# Patient Record
Sex: MALE | Race: WHITE | NOT HISPANIC OR LATINO
[De-identification: names, ages, dates, MRNs, and addresses within clinical notes are randomized per-mention and may not be internally consistent; named-entity substitution may affect disease eponyms.]

---

## 2018-09-19 ENCOUNTER — MESSAGE (OUTPATIENT)
Age: 38
End: 2018-09-19

## 2018-09-20 ENCOUNTER — APPOINTMENT (OUTPATIENT)
Dept: UROLOGY | Facility: CLINIC | Age: 38
End: 2018-09-20
Payer: COMMERCIAL

## 2018-09-20 VITALS
WEIGHT: 295 LBS | RESPIRATION RATE: 14 BRPM | BODY MASS INDEX: 41.3 KG/M2 | SYSTOLIC BLOOD PRESSURE: 137 MMHG | HEART RATE: 88 BPM | HEIGHT: 71 IN | DIASTOLIC BLOOD PRESSURE: 91 MMHG

## 2018-09-20 DIAGNOSIS — F17.210 NICOTINE DEPENDENCE, CIGARETTES, UNCOMPLICATED: ICD-10-CM

## 2018-09-20 DIAGNOSIS — Z78.9 OTHER SPECIFIED HEALTH STATUS: ICD-10-CM

## 2018-09-20 DIAGNOSIS — Z87.39 PERSONAL HISTORY OF OTHER DISEASES OF THE MUSCULOSKELETAL SYSTEM AND CONNECTIVE TISSUE: ICD-10-CM

## 2018-09-20 LAB
BILIRUB UR QL STRIP: NORMAL
CLARITY UR: CLEAR
COLLECTION METHOD: NORMAL
GLUCOSE UR-MCNC: NORMAL
HCG UR QL: 0.2 EU/DL
HGB UR QL STRIP.AUTO: NORMAL
KETONES UR-MCNC: NORMAL
LEUKOCYTE ESTERASE UR QL STRIP: NORMAL
NITRITE UR QL STRIP: NORMAL
PH UR STRIP: 7
PROT UR STRIP-MCNC: NORMAL
SP GR UR STRIP: 1.02

## 2018-09-20 PROCEDURE — 51798 US URINE CAPACITY MEASURE: CPT

## 2018-09-20 PROCEDURE — 99204 OFFICE O/P NEW MOD 45 MIN: CPT | Mod: 25

## 2018-09-20 PROCEDURE — 81003 URINALYSIS AUTO W/O SCOPE: CPT | Mod: QW

## 2018-09-21 LAB
ESTRADIOL SERPL-MCNC: 8 PG/ML
FSH SERPL-MCNC: 3.5 IU/L
PROLACTIN SERPL-MCNC: 5.5 NG/ML

## 2018-09-25 LAB
TESTOST BND SERPL-MCNC: 8.15 NG/DL
TESTOSTERONE BIOAVAILABLE: 72 NG/DL
TESTOSTERONE TOTAL S: 302 NG/DL

## 2018-10-04 ENCOUNTER — APPOINTMENT (OUTPATIENT)
Dept: UROLOGY | Facility: CLINIC | Age: 38
End: 2018-10-04
Payer: COMMERCIAL

## 2018-10-04 VITALS
BODY MASS INDEX: 41.3 KG/M2 | HEART RATE: 93 BPM | SYSTOLIC BLOOD PRESSURE: 142 MMHG | DIASTOLIC BLOOD PRESSURE: 93 MMHG | RESPIRATION RATE: 14 BRPM | WEIGHT: 295 LBS | HEIGHT: 71 IN

## 2018-10-04 PROCEDURE — 99213 OFFICE O/P EST LOW 20 MIN: CPT | Mod: 25

## 2018-10-04 PROCEDURE — 96372 THER/PROPH/DIAG INJ SC/IM: CPT

## 2018-10-04 RX ORDER — TESTOSTERONE CYPIONATE 200 MG/ML
200 INJECTION, SOLUTION INTRAMUSCULAR
Refills: 0 | Status: COMPLETED | OUTPATIENT
Start: 2018-10-04

## 2018-10-04 RX ADMIN — TESTOSTERONE CYPIONATE 0 MG/ML: 200 INJECTION, SOLUTION INTRAMUSCULAR at 00:00

## 2018-10-18 ENCOUNTER — APPOINTMENT (OUTPATIENT)
Dept: UROLOGY | Facility: CLINIC | Age: 38
End: 2018-10-18
Payer: COMMERCIAL

## 2018-10-18 VITALS — BODY MASS INDEX: 41.3 KG/M2 | RESPIRATION RATE: 14 BRPM | HEIGHT: 71 IN | HEART RATE: 90 BPM | WEIGHT: 295 LBS

## 2018-10-18 PROCEDURE — 96372 THER/PROPH/DIAG INJ SC/IM: CPT

## 2018-10-19 ENCOUNTER — MEDICATION RENEWAL (OUTPATIENT)
Age: 38
End: 2018-10-19

## 2018-11-01 ENCOUNTER — APPOINTMENT (OUTPATIENT)
Dept: UROLOGY | Facility: CLINIC | Age: 38
End: 2018-11-01

## 2018-11-21 ENCOUNTER — MEDICATION RENEWAL (OUTPATIENT)
Age: 38
End: 2018-11-21

## 2018-12-05 ENCOUNTER — MESSAGE (OUTPATIENT)
Age: 38
End: 2018-12-05

## 2018-12-06 ENCOUNTER — APPOINTMENT (OUTPATIENT)
Dept: UROLOGY | Facility: CLINIC | Age: 38
End: 2018-12-06
Payer: COMMERCIAL

## 2018-12-06 PROCEDURE — 99214 OFFICE O/P EST MOD 30 MIN: CPT

## 2018-12-17 LAB
HCT VFR BLD CALC: 53.6 %
HGB BLD-MCNC: 18.4 G/DL

## 2018-12-19 LAB
TESTOST BND SERPL-MCNC: 43.6 NG/DL
TESTOSTERONE BIOAVAILABLE: 610 NG/DL
TESTOSTERONE TOTAL S: 1090 NG/DL

## 2019-01-18 ENCOUNTER — MEDICATION RENEWAL (OUTPATIENT)
Age: 39
End: 2019-01-18

## 2019-02-04 LAB
HCT VFR BLD CALC: 51.4 %
HGB BLD-MCNC: 17.1 G/DL

## 2019-02-06 LAB
TESTOST BND SERPL-MCNC: 6.52 NG/DL
TESTOSTERONE BIOAVAILABLE: 77 NG/DL
TESTOSTERONE TOTAL S: 233 NG/DL

## 2019-02-12 ENCOUNTER — MEDICATION RENEWAL (OUTPATIENT)
Age: 39
End: 2019-02-12

## 2019-02-14 ENCOUNTER — APPOINTMENT (OUTPATIENT)
Age: 39
End: 2019-02-14
Payer: COMMERCIAL

## 2019-02-14 VITALS
WEIGHT: 295 LBS | RESPIRATION RATE: 14 BRPM | BODY MASS INDEX: 41.3 KG/M2 | SYSTOLIC BLOOD PRESSURE: 144 MMHG | DIASTOLIC BLOOD PRESSURE: 85 MMHG | HEART RATE: 84 BPM | HEIGHT: 71 IN

## 2019-02-14 PROCEDURE — 99213 OFFICE O/P EST LOW 20 MIN: CPT | Mod: 25

## 2019-02-14 PROCEDURE — 96372 THER/PROPH/DIAG INJ SC/IM: CPT

## 2019-02-14 NOTE — HISTORY OF PRESENT ILLNESS
[FreeTextEntry1] : 40 yo male presents for follow up. \par Doing Testosterone self injections, 1 ml every 2 weeks. Last injection 3 weeks ago. \par Also does routine blood donation\par Feels better, improved energy and libido. \par Denies any difficulty with urination. \par \par Initially seen for low Testosterone. \par Has been feeling fatigued, low libido and has had 30 lbs weight gain in last 3 months. \par Rated erections as 4/5. \par Denied any difficulty with urination. \par Denied dysuria, hematuria, lower abdominal or flank pain, fever, chills or rigors. \par No family history of Prostate cancer.

## 2019-02-14 NOTE — ASSESSMENT
[FreeTextEntry1] : Hypogonadism:\par Discussed blood work. \par Testosterone cypionate 300 mg given. \par Will do self injections Testosterone cypionate 1.5 ml every 2 weeks. \par Repeat labs in May 2019. \par

## 2019-02-19 ENCOUNTER — MEDICATION RENEWAL (OUTPATIENT)
Age: 39
End: 2019-02-19

## 2019-03-26 ENCOUNTER — MEDICATION RENEWAL (OUTPATIENT)
Age: 39
End: 2019-03-26

## 2019-04-06 NOTE — REVIEW OF SYSTEMS
Date of service: 04/05/2019



PROCEDURE:  CT Abdomen and Pelvis with contrast



HISTORY:

Abd pain



COMPARISON:

CT abdomen and pelvis without contrast performed 2/1/18



TECHNIQUE:

Contrast dose: 95 mL Omnipaque 300 IV



Radiation dose:



Total exam DLP = 779.19 mGy-cm.



This CT exam was performed using one or more of the following dose 

reduction techniques: Automated exposure control, adjustment of the 

mA and/or kV according to patient size, and/or use of iterative 

reconstruction technique.



FINDINGS:



LOWER THORAX:

No visible consolidation, pleural effusion, or pneumothorax.



LIVER:

Hypoattenuation of the liver compatible with hepatic steatosis. 



GALLBLADDER AND BILE DUCTS:

Cholecystectomy. 



PANCREAS:

Unremarkable.



SPLEEN:

Unremarkable. 



ADRENALS:

Unremarkable. 



KIDNEYS AND URETERS:

The kidneys enhance symmetrically. No hydronephrosis or obstructing 

calculus identified. 



VASCULATURE:

No aortic aneurysm. Atherosclerotic calcifications of the aorta.



BOWEL:

Stomach is nondistended.  



Lack of oral contrast limits evaluation for bowel pathology.  Bowel 

loops appear within normal limits of caliber without evidence of 

obstruction.  Diverticulosis without CT evidence of acute 

diverticulitis. 



APPENDIX:

The appendix appears within normal limits of caliber. No secondary 

signs of acute appendicitis.



PERITONEUM:

No significant free fluid.  No definite free air. 



LYMPH NODES:

No bulky adenopathy identified. 



BLADDER:

Unremarkable. 



REPRODUCTIVE:

Unremarkable. 



BONES:

Osseous demineralization.  Degenerative changes most pronounced at 

L5-S1. 



OTHER FINDINGS:

None.



IMPRESSION:

Hepatic steatosis. 



Cholecystectomy. 



Diverticulosis without CT evidence of acute diverticulitis. 



Preliminary impression was provided by [facility] [see HPI] : see HPI [Negative] : Heme/Lymph

## 2019-04-18 ENCOUNTER — MEDICATION RENEWAL (OUTPATIENT)
Age: 39
End: 2019-04-18

## 2019-04-25 ENCOUNTER — MEDICATION RENEWAL (OUTPATIENT)
Age: 39
End: 2019-04-25

## 2019-07-04 ENCOUNTER — TRANSCRIPTION ENCOUNTER (OUTPATIENT)
Age: 39
End: 2019-07-04

## 2019-07-11 ENCOUNTER — APPOINTMENT (OUTPATIENT)
Dept: UROLOGY | Facility: CLINIC | Age: 39
End: 2019-07-11
Payer: COMMERCIAL

## 2019-07-11 VITALS
WEIGHT: 295 LBS | BODY MASS INDEX: 41.3 KG/M2 | OXYGEN SATURATION: 95 % | DIASTOLIC BLOOD PRESSURE: 85 MMHG | HEART RATE: 99 BPM | SYSTOLIC BLOOD PRESSURE: 142 MMHG | HEIGHT: 71 IN

## 2019-07-11 PROCEDURE — 99214 OFFICE O/P EST MOD 30 MIN: CPT

## 2019-07-11 NOTE — ASSESSMENT
[FreeTextEntry1] : Hypogonadism:\par Reviewed blood work. \par Asked to discuss abnormal labs with PCP. \par PSA went up, will monitor. \par Will get Estradiol with next labs. \par Discussed Xyosted. Pt interested. Did self injection teaching. \par \par Return to office in 3 months or sooner if any issues- with repeat labs.

## 2019-07-11 NOTE — HISTORY OF PRESENT ILLNESS
[FreeTextEntry1] : 40 yo male presents for follow up. \par Doing Testosterone self injections, 1.5 ml every 2 weeks. \par Also does routine blood donation. \par Feels better, improved energy and libido. \par Denies any difficulty with urination. \par \par Initially seen for low Testosterone. \par Has been feeling fatigued, low libido and has had 30 lbs weight gain in last 3 months. \par Rated erections as 4/5. \par Denied any difficulty with urination. \par Denied dysuria, hematuria, lower abdominal or flank pain, fever, chills or rigors. \par No family history of Prostate cancer.

## 2019-08-07 ENCOUNTER — MEDICATION RENEWAL (OUTPATIENT)
Age: 39
End: 2019-08-07

## 2019-09-05 ENCOUNTER — MEDICATION RENEWAL (OUTPATIENT)
Age: 39
End: 2019-09-05

## 2019-10-04 ENCOUNTER — RX RENEWAL (OUTPATIENT)
Age: 39
End: 2019-10-04

## 2019-10-04 ENCOUNTER — MEDICATION RENEWAL (OUTPATIENT)
Age: 39
End: 2019-10-04

## 2019-10-17 ENCOUNTER — APPOINTMENT (OUTPATIENT)
Dept: UROLOGY | Facility: CLINIC | Age: 39
End: 2019-10-17

## 2019-11-01 ENCOUNTER — MEDICATION RENEWAL (OUTPATIENT)
Age: 39
End: 2019-11-01

## 2019-11-29 ENCOUNTER — MEDICATION RENEWAL (OUTPATIENT)
Age: 39
End: 2019-11-29

## 2020-01-16 ENCOUNTER — APPOINTMENT (OUTPATIENT)
Dept: UROLOGY | Facility: CLINIC | Age: 40
End: 2020-01-16
Payer: COMMERCIAL

## 2020-01-16 VITALS
HEIGHT: 71 IN | HEART RATE: 86 BPM | DIASTOLIC BLOOD PRESSURE: 85 MMHG | BODY MASS INDEX: 40.88 KG/M2 | SYSTOLIC BLOOD PRESSURE: 151 MMHG | RESPIRATION RATE: 14 BRPM | WEIGHT: 292 LBS

## 2020-01-16 PROCEDURE — 96372 THER/PROPH/DIAG INJ SC/IM: CPT

## 2020-01-16 PROCEDURE — 99214 OFFICE O/P EST MOD 30 MIN: CPT | Mod: 25

## 2020-01-16 RX ORDER — TESTOSTERONE CYPIONATE 200 MG/ML
200 INJECTION, SOLUTION INTRAMUSCULAR
Refills: 0 | Status: COMPLETED | OUTPATIENT
Start: 2020-01-16

## 2020-01-16 RX ADMIN — TESTOSTERONE CYPIONATE 0 MG/ML: 200 INJECTION INTRAMUSCULAR at 00:00

## 2020-01-16 NOTE — PHYSICAL EXAM
[General Appearance - Well Developed] : well developed [General Appearance - In No Acute Distress] : no acute distress [Normal Appearance] : normal appearance [Skin Color & Pigmentation] : normal skin color and pigmentation [FreeTextEntry1] : normal peripheral circulation  [Normal Station and Gait] : the gait and station were normal for the patient's age [] : no respiratory distress [Oriented To Time, Place, And Person] : oriented to person, place, and time [No Focal Deficits] : no focal deficits

## 2020-01-16 NOTE — ASSESSMENT
[FreeTextEntry1] : Hypogonadism:\par Reviewed outside records. \par Encouraged blood donation or recommended pt see Hematology for Phlebotomy. \par Pt received Testosterone cypionate 300 mg today. Will start Xyosted 100 mg SC weekly next week. \par Repeat labs in 3 months. \par \par Return to office in 6 months or sooner if any issues.

## 2020-01-16 NOTE — HISTORY OF PRESENT ILLNESS
[FreeTextEntry1] : 40 yo male presents for follow up. \par Doing Testosterone self injections: Xyosted 75 mg weekly. \par Feels better decreased libido. \par Denies any difficulty with urination. \par \par Initially seen for low Testosterone. \par Has been feeling fatigued, low libido and has had 30 lbs weight gain in last 3 months. \par Rated erections as 4/5. \par Denied any difficulty with urination. \par Denied dysuria, hematuria, lower abdominal or flank pain, fever, chills or rigors. \par No family history of Prostate cancer.

## 2020-03-10 ENCOUNTER — RX RENEWAL (OUTPATIENT)
Age: 40
End: 2020-03-10

## 2020-03-25 ENCOUNTER — RX RENEWAL (OUTPATIENT)
Age: 40
End: 2020-03-25

## 2020-07-30 ENCOUNTER — APPOINTMENT (OUTPATIENT)
Dept: UROLOGY | Facility: CLINIC | Age: 40
End: 2020-07-30
Payer: COMMERCIAL

## 2020-07-30 PROCEDURE — 99214 OFFICE O/P EST MOD 30 MIN: CPT

## 2020-07-31 NOTE — PHYSICAL EXAM
[Normal Appearance] : normal appearance [General Appearance - Well Developed] : well developed [Abdomen Soft] : soft [General Appearance - In No Acute Distress] : no acute distress [Abdomen Mass (___ Cm)] : no abdominal mass palpated [Costovertebral Angle Tenderness] : no ~M costovertebral angle tenderness [Abdomen Tenderness] : non-tender [Penis Abnormality] : normal circumcised penis [Scrotum] : the scrotum was normal [Urethral Meatus] : meatus normal [Testes Tenderness] : no tenderness of the testes [Testes Mass (___cm)] : there were no testicular masses [Epididymis] : the epididymides were normal [No Prostate Nodules] : no prostate nodules [Prostate Tenderness] : the prostate was not tender [Prostate Size ___ gm] : prostate size [unfilled] gm [FreeTextEntry1] : normal peripheral circulation  [Skin Color & Pigmentation] : normal skin color and pigmentation [Normal Station and Gait] : the gait and station were normal for the patient's age [] : no respiratory distress [Oriented To Time, Place, And Person] : oriented to person, place, and time [No Focal Deficits] : no focal deficits

## 2020-07-31 NOTE — ASSESSMENT
[FreeTextEntry1] : Hypogonadism:\par Discussed treatment options. Will like to switch back to Testosterone cypionate injections. \par Has Xyosted 100 and 75 mg left, will do that next week: 175 mg and then in 2 weeks will start Testosterone cypionate. \par Testosterone cypionate 300 mg every 2 weeks. \par Will start Anastrazole. \par Repeat labs in 3 months. \par \par Return to office in 6 months or sooner if any issues.

## 2020-07-31 NOTE — HISTORY OF PRESENT ILLNESS
[FreeTextEntry1] : 41 yo male presents for follow up. \par Doing Testosterone self injections: Xyosted 100 mg weekly. \par Felt better with Testosterone injections than Xyosted. Variable libido. Normal erections. \par Gained weight and complaining of breast enlargement. \par Denies any difficulty with urination. \par Denies dysuria, hematuria, lower abdominal or flank pain, fever, chills or rigors. \par Did blood donation after lab work. \par \par Initially seen for low Testosterone. \par Has been feeling fatigued, low libido and has had 30 lbs weight gain in last 3 months. \par Rated erections as 4/5. \par Denied any difficulty with urination. \par Denied dysuria, hematuria, lower abdominal or flank pain, fever, chills or rigors. \par No family history of Prostate cancer.

## 2020-11-04 ENCOUNTER — NON-APPOINTMENT (OUTPATIENT)
Age: 40
End: 2020-11-04

## 2020-11-17 ENCOUNTER — APPOINTMENT (OUTPATIENT)
Dept: UROLOGY | Facility: CLINIC | Age: 40
End: 2020-11-17

## 2020-12-08 ENCOUNTER — APPOINTMENT (OUTPATIENT)
Dept: UROLOGY | Facility: CLINIC | Age: 40
End: 2020-12-08

## 2021-01-12 ENCOUNTER — APPOINTMENT (OUTPATIENT)
Dept: UROLOGY | Facility: CLINIC | Age: 41
End: 2021-01-12
Payer: COMMERCIAL

## 2021-01-12 VITALS — TEMPERATURE: 97.1 F | HEART RATE: 97 BPM | SYSTOLIC BLOOD PRESSURE: 133 MMHG | DIASTOLIC BLOOD PRESSURE: 79 MMHG

## 2021-01-12 PROCEDURE — 99072 ADDL SUPL MATRL&STAF TM PHE: CPT

## 2021-01-12 PROCEDURE — 99213 OFFICE O/P EST LOW 20 MIN: CPT

## 2021-01-12 NOTE — PHYSICAL EXAM
[Normal Appearance] : normal appearance [General Appearance - In No Acute Distress] : no acute distress [Skin Color & Pigmentation] : normal skin color and pigmentation [] : no respiratory distress [Oriented To Time, Place, And Person] : oriented to person, place, and time [Normal Station and Gait] : the gait and station were normal for the patient's age [FreeTextEntry1] : normal peripheral circulation

## 2021-01-12 NOTE — HISTORY OF PRESENT ILLNESS
[FreeTextEntry1] : 39 yo male presents for follow up. \par On Testosterone replacement therapy: Testosterone cypionate 300 mg every 2 weeks. \par Also taking Anastrozole. Towards the end of 2 weeks feels Fatigued. \par Did blood donation after last lab.\par Denies any difficulty with urination. \par Normal erections.\par \par With Xyosted. Variable libido.\par \par Initially seen for low Testosterone. \par Has been feeling fatigued, low libido and has had 30 lbs weight gain in last 3 months. \par Rated erections as 4/5. \par Denied any difficulty with urination. \par Denied dysuria, hematuria, lower abdominal or flank pain, fever, chills or rigors. \par No family history of Prostate cancer.

## 2021-01-12 NOTE — ASSESSMENT
[FreeTextEntry1] : Reviewed outside records.\par 11/6/20: \par Testosterone: 346 \par Hemoglobin and Hematocrit: 18.1/54.2\par Estradiol: < 5\par \par Hypogonadism:\par Will do Testosterone cypionate 100 mg every week. \par Continue Anastrozole. \par Repeat Labs in 2 months. \par \par Return to office in 6 months or sooner if any issues- will do Uroflo/PVR.

## 2021-04-26 ENCOUNTER — RX RENEWAL (OUTPATIENT)
Age: 41
End: 2021-04-26

## 2021-07-13 ENCOUNTER — APPOINTMENT (OUTPATIENT)
Dept: UROLOGY | Facility: CLINIC | Age: 41
End: 2021-07-13

## 2021-09-15 ENCOUNTER — NON-APPOINTMENT (OUTPATIENT)
Age: 41
End: 2021-09-15

## 2021-10-04 ENCOUNTER — NON-APPOINTMENT (OUTPATIENT)
Age: 41
End: 2021-10-04

## 2021-10-05 ENCOUNTER — APPOINTMENT (OUTPATIENT)
Dept: UROLOGY | Facility: CLINIC | Age: 41
End: 2021-10-05
Payer: COMMERCIAL

## 2021-10-05 ENCOUNTER — APPOINTMENT (OUTPATIENT)
Dept: UROLOGY | Facility: CLINIC | Age: 41
End: 2021-10-05

## 2021-10-05 VITALS
WEIGHT: 292 LBS | SYSTOLIC BLOOD PRESSURE: 153 MMHG | HEART RATE: 101 BPM | BODY MASS INDEX: 40.88 KG/M2 | HEIGHT: 71 IN | DIASTOLIC BLOOD PRESSURE: 93 MMHG

## 2021-10-05 LAB
ALBUMIN SERPL ELPH-MCNC: 4.8 G/DL
ALP BLD-CCNC: 64 U/L
ALT SERPL-CCNC: 56 U/L
ANION GAP SERPL CALC-SCNC: 14 MMOL/L
AST SERPL-CCNC: 30 U/L
BILIRUB SERPL-MCNC: 0.8 MG/DL
BUN SERPL-MCNC: 13 MG/DL
CALCIUM SERPL-MCNC: 10.1 MG/DL
CHLORIDE SERPL-SCNC: 102 MMOL/L
CHOLEST SERPL-MCNC: 249 MG/DL
CO2 SERPL-SCNC: 23 MMOL/L
CREAT SERPL-MCNC: 1.1 MG/DL
ESTIMATED AVERAGE GLUCOSE: 114 MG/DL
GLUCOSE SERPL-MCNC: 94 MG/DL
HBA1C MFR BLD HPLC: 5.6 %
HCT VFR BLD CALC: 51.5 %
HCT VFR BLD CALC: 53 %
HDLC SERPL-MCNC: 39 MG/DL
HGB BLD-MCNC: 17.3 G/DL
HGB BLD-MCNC: 17.4 G/DL
LDLC SERPL CALC-MCNC: 177 MG/DL
NONHDLC SERPL-MCNC: 211 MG/DL
POTASSIUM SERPL-SCNC: 4.7 MMOL/L
PROT SERPL-MCNC: 7.1 G/DL
PSA SERPL-MCNC: 1.63 NG/ML
SODIUM SERPL-SCNC: 139 MMOL/L
TESTOST BND SERPL-MCNC: 14 PG/ML
TESTOST SERPL-MCNC: 461 NG/DL
TESTOSTERONE TOTAL S: 548 NG/DL
TRIGL SERPL-MCNC: 167 MG/DL

## 2021-10-05 PROCEDURE — 51741 ELECTRO-UROFLOWMETRY FIRST: CPT

## 2021-10-05 PROCEDURE — 51798 US URINE CAPACITY MEASURE: CPT

## 2021-10-05 PROCEDURE — 99214 OFFICE O/P EST MOD 30 MIN: CPT | Mod: 25

## 2021-10-23 NOTE — ASSESSMENT
[FreeTextEntry1] : Benign Prostatic Hyperplasia:\par No bothersome lower urinary tract symptoms and minimal post void residual. \par See Uroflo and PVR. \par \par Hypogonadism:\par Discussed labs. \par Continue Testosterone cypionate 300 mg IM every 2 weeks. \par Will check Testosterone and Hemoglobin and Hematocrit in 3 months. \par \par Return to office in 6 months or sooner if any issues.

## 2021-10-23 NOTE — HISTORY OF PRESENT ILLNESS
[FreeTextEntry1] : 40 yo male presents for follow up. \par On Testosterone replacement therapy: Testosterone cypionate 300 mg every 2 weeks. \par Also taking Anastrozole. Towards the end of 2 weeks feels Fatigued. \par Did blood donation after last lab.\par Denies any difficulty with urination. \par Normal erections.\par \par With Xyosted. Variable libido.\par \par Initially seen for low Testosterone. \par Has been feeling fatigued, low libido and has had 30 lbs weight gain in last 3 months. \par Rated erections as 4/5. \par Denied any difficulty with urination. \par Denied dysuria, hematuria, lower abdominal or flank pain, fever, chills or rigors. \par No family history of Prostate cancer.

## 2021-11-30 RX ORDER — TESTOSTERONE CYPIONATE 200 MG/ML
200 INJECTION, SOLUTION INTRAMUSCULAR
Qty: 3 | Refills: 0 | Status: DISCONTINUED | COMMUNITY
Start: 2018-10-04 | End: 2021-11-30

## 2021-11-30 RX ORDER — TESTOSTERONE ENANTHATE 100 MG/.5ML
100 INJECTION SUBCUTANEOUS
Qty: 2 | Refills: 0 | Status: DISCONTINUED | COMMUNITY
Start: 2019-07-11 | End: 2021-11-30

## 2021-11-30 RX ORDER — TESTOSTERONE ENANTHATE 75 MG/.5ML
75 INJECTION SUBCUTANEOUS
Qty: 2 | Refills: 0 | Status: DISCONTINUED | COMMUNITY
Start: 2019-10-04 | End: 2021-11-30

## 2022-01-02 ENCOUNTER — LABORATORY RESULT (OUTPATIENT)
Age: 42
End: 2022-01-02

## 2022-02-16 RX ORDER — TESTOSTERONE CYPIONATE 100 MG/ML
100 INJECTION, SOLUTION INTRAMUSCULAR
Qty: 4 | Refills: 0 | Status: DISCONTINUED | COMMUNITY
Start: 2018-10-19 | End: 2022-02-16

## 2022-04-04 ENCOUNTER — APPOINTMENT (OUTPATIENT)
Dept: ORTHOPEDIC SURGERY | Facility: CLINIC | Age: 42
End: 2022-04-04

## 2022-04-12 ENCOUNTER — RX RENEWAL (OUTPATIENT)
Age: 42
End: 2022-04-12

## 2022-04-18 LAB
HCT VFR BLD CALC: 51 %
HGB BLD-MCNC: 16.6 G/DL
TESTOST BND SERPL-MCNC: 15.2 PG/ML
TESTOSTERONE TOTAL S: 548 NG/DL

## 2022-05-03 ENCOUNTER — APPOINTMENT (OUTPATIENT)
Dept: UROLOGY | Facility: CLINIC | Age: 42
End: 2022-05-03
Payer: COMMERCIAL

## 2022-05-03 VITALS
HEIGHT: 71 IN | HEART RATE: 94 BPM | TEMPERATURE: 98.7 F | RESPIRATION RATE: 16 BRPM | DIASTOLIC BLOOD PRESSURE: 75 MMHG | BODY MASS INDEX: 40.6 KG/M2 | WEIGHT: 290 LBS | SYSTOLIC BLOOD PRESSURE: 149 MMHG

## 2022-05-03 PROCEDURE — 99213 OFFICE O/P EST LOW 20 MIN: CPT

## 2022-05-04 NOTE — ASSESSMENT
[FreeTextEntry1] : Hypogonadism:\par Discussed labs. \par Discussed treatment options. Will increase Testosterone cypionate to 150 mg every week. \par Continue Anastrazole. \par Labs in 3 months. \par \par \par Return to office in 6 months or sooner if any issues.

## 2022-05-04 NOTE — PHYSICAL EXAM
[Normal Appearance] : normal appearance [General Appearance - In No Acute Distress] : no acute distress [Abdomen Soft] : soft [Abdomen Tenderness] : non-tender [FreeTextEntry1] : CURRY deferred, Patient request [] : no respiratory distress [Oriented To Time, Place, And Person] : oriented to person, place, and time [Normal Station and Gait] : the gait and station were normal for the patient's age

## 2022-05-04 NOTE — HISTORY OF PRESENT ILLNESS
[FreeTextEntry1] : 43 yo male presents for follow up. \par On Testosterone replacement therapy: Testosterone cypionate 100 mg every week.  \par Also taking Anastrozole. Towards the end of the week feels Fatigued. \par Did blood donation recently. \par Denies any difficulty with urination. \par Normal erections.\par On Antibiotics, has Diarrhea. \par \par With Xyosted had variable libido.\par \par Initially seen for low Testosterone. \par Has been feeling fatigued, low libido and has had 30 lbs weight gain in last 3 months. \par Rated erections as 4/5. \par Denied any difficulty with urination. \par Denied dysuria, hematuria, lower abdominal or flank pain, fever, chills or rigors. \par No family history of Prostate cancer.

## 2022-06-06 RX ORDER — HYDROCORTISONE 25 MG/G
2.5 CREAM TOPICAL TWICE DAILY
Qty: 1 | Refills: 3 | Status: ACTIVE | COMMUNITY
Start: 2022-06-06 | End: 1900-01-01

## 2022-06-15 RX ORDER — NEEDLES, FILTER 19GX1 1/2"
23G X 1" NEEDLE, DISPOSABLE MISCELLANEOUS
Refills: 0 | Status: ACTIVE | COMMUNITY

## 2022-06-15 RX ORDER — PROMETHAZINE HYDROCHLORIDE 12.5 MG/1
12.5 SUPPOSITORY RECTAL
Refills: 0 | Status: ACTIVE | COMMUNITY

## 2022-06-15 RX ORDER — ASPIRIN 300 MG
325 SUPPOSITORY, RECTAL RECTAL
Refills: 0 | Status: ACTIVE | COMMUNITY

## 2022-06-15 RX ORDER — OXYCODONE HYDROCHLORIDE 5 MG/1
5 CAPSULE ORAL
Refills: 0 | Status: ACTIVE | COMMUNITY

## 2022-06-15 RX ORDER — ONDANSETRON 8 MG/1
8 TABLET, ORALLY DISINTEGRATING ORAL
Refills: 0 | Status: ACTIVE | COMMUNITY

## 2022-06-21 ENCOUNTER — LABORATORY RESULT (OUTPATIENT)
Age: 42
End: 2022-06-21

## 2022-06-21 ENCOUNTER — RESULT CHARGE (OUTPATIENT)
Age: 42
End: 2022-06-21

## 2022-06-21 ENCOUNTER — APPOINTMENT (OUTPATIENT)
Dept: PHYSICAL MEDICINE AND REHAB | Facility: CLINIC | Age: 42
End: 2022-06-21
Payer: COMMERCIAL

## 2022-06-21 VITALS
BODY MASS INDEX: 41.3 KG/M2 | SYSTOLIC BLOOD PRESSURE: 120 MMHG | TEMPERATURE: 97.9 F | HEART RATE: 79 BPM | HEIGHT: 71 IN | WEIGHT: 295 LBS | RESPIRATION RATE: 16 BRPM | OXYGEN SATURATION: 98 % | DIASTOLIC BLOOD PRESSURE: 80 MMHG

## 2022-06-21 DIAGNOSIS — Z00.00 ENCOUNTER FOR GENERAL ADULT MEDICAL EXAMINATION W/OUT ABNORMAL FINDINGS: ICD-10-CM

## 2022-06-21 PROCEDURE — 81003 URINALYSIS AUTO W/O SCOPE: CPT | Mod: QW

## 2022-06-21 PROCEDURE — G0444 DEPRESSION SCREEN ANNUAL: CPT | Mod: 59

## 2022-06-21 PROCEDURE — 99396 PREV VISIT EST AGE 40-64: CPT | Mod: 25

## 2022-06-21 PROCEDURE — 36415 COLL VENOUS BLD VENIPUNCTURE: CPT

## 2022-06-21 PROCEDURE — 93000 ELECTROCARDIOGRAM COMPLETE: CPT | Mod: 59

## 2022-06-21 RX ORDER — AZITHROMYCIN 250 MG/1
250 TABLET, FILM COATED ORAL
Qty: 6 | Refills: 0 | Status: DISCONTINUED | COMMUNITY
Start: 2021-12-20

## 2022-06-21 RX ORDER — AMOXICILLIN 875 MG/1
875 TABLET, FILM COATED ORAL
Qty: 20 | Refills: 0 | Status: DISCONTINUED | COMMUNITY
Start: 2022-04-30

## 2022-06-21 NOTE — PHYSICAL EXAM
[No Acute Distress] : no acute distress [Well Nourished] : well nourished [Well Developed] : well developed [Well-Appearing] : well-appearing [Normal Sclera/Conjunctiva] : normal sclera/conjunctiva [PERRL] : pupils equal round and reactive to light [EOMI] : extraocular movements intact [Normal Outer Ear/Nose] : the outer ears and nose were normal in appearance [Normal Oropharynx] : the oropharynx was normal [No JVD] : no jugular venous distention [No Lymphadenopathy] : no lymphadenopathy [Supple] : supple [Thyroid Normal, No Nodules] : the thyroid was normal and there were no nodules present [No Respiratory Distress] : no respiratory distress  [No Accessory Muscle Use] : no accessory muscle use [Clear to Auscultation] : lungs were clear to auscultation bilaterally [Normal Rate] : normal rate  [Regular Rhythm] : with a regular rhythm [Normal S1, S2] : normal S1 and S2 [No Carotid Bruits] : no carotid bruits [No Abdominal Bruit] : a ~M bruit was not heard ~T in the abdomen [No Varicosities] : no varicosities [Pedal Pulses Present] : the pedal pulses are present [No Edema] : there was no peripheral edema [No Palpable Aorta] : no palpable aorta [No Extremity Clubbing/Cyanosis] : no extremity clubbing/cyanosis [Soft] : abdomen soft [Non Tender] : non-tender [Non-distended] : non-distended [No Masses] : no abdominal mass palpated [No HSM] : no HSM [Normal Bowel Sounds] : normal bowel sounds [Normal Posterior Cervical Nodes] : no posterior cervical lymphadenopathy [Normal Anterior Cervical Nodes] : no anterior cervical lymphadenopathy [No CVA Tenderness] : no CVA  tenderness [No Spinal Tenderness] : no spinal tenderness [No Joint Swelling] : no joint swelling [Grossly Normal Strength/Tone] : grossly normal strength/tone [No Rash] : no rash [Coordination Grossly Intact] : coordination grossly intact [No Focal Deficits] : no focal deficits [Normal Gait] : normal gait [Deep Tendon Reflexes (DTR)] : deep tendon reflexes were 2+ and symmetric [Normal Affect] : the affect was normal [Normal Insight/Judgement] : insight and judgment were intact [de-identified] : murmur

## 2022-06-21 NOTE — PLAN
[FreeTextEntry1] : EKG - NSR -78 , JAKOB - 0.158, No acute T wave changes noted..\par \par Labs Drawn and sent to [facility] labs.\par \par Patient to continue with present medications.\par \par Increase fluid intake..\par \par RTO in 7-10 days for re-eval..\par \par I, Markie Morales , attest that this documentation has been prepared under the direction and in the presence of Provider Sudeep Hendrix DNP\par The documentation recorded by the scribe, in my presence, accurately reflects the service I personally performed, and the decisions made by me with my edits as appropriate.\par Sudeep Hendrix DNP\par

## 2022-06-21 NOTE — HEALTH RISK ASSESSMENT
[0] : 2) Feeling down, depressed, or hopeless: Not at all (0) [PHQ-2 Negative - No further assessment needed] : PHQ-2 Negative - No further assessment needed [HOX2Eadjs] : 0

## 2022-06-21 NOTE — HISTORY OF PRESENT ILLNESS
[FreeTextEntry1] : wellness [de-identified] : Patient presents today for physical exam. His last PE was over a year ago and since that time he has not had any significant changes to his medical history. Denies any CP, SOB or diff breathing. No recent fever, chills, cough or cold type symptoms. Has no other complaints at this time.\par \par

## 2022-06-27 LAB
25(OH)D3 SERPL-MCNC: 65.8 NG/ML
ALBUMIN SERPL ELPH-MCNC: 4.7 G/DL
ALP BLD-CCNC: 68 U/L
ALT SERPL-CCNC: 41 U/L
ANION GAP SERPL CALC-SCNC: 18 MMOL/L
AST SERPL-CCNC: 29 U/L
BASOPHILS # BLD AUTO: 0.1 K/UL
BASOPHILS NFR BLD AUTO: 1.5 %
BILIRUB SERPL-MCNC: 0.8 MG/DL
BILIRUB UR QL STRIP: NORMAL
BUN SERPL-MCNC: 16 MG/DL
CALCIUM SERPL-MCNC: 9.3 MG/DL
CHLORIDE SERPL-SCNC: 102 MMOL/L
CHOLEST SERPL-MCNC: 229 MG/DL
CLARITY UR: CLEAR
CO2 SERPL-SCNC: 20 MMOL/L
COLLECTION METHOD: NORMAL
CREAT SERPL-MCNC: 1.2 MG/DL
EGFR: 76 ML/MIN/1.73M2
EOSINOPHIL # BLD AUTO: 0.22 K/UL
EOSINOPHIL NFR BLD AUTO: 3.3 %
ESTIMATED AVERAGE GLUCOSE: 108 MG/DL
ESTROGEN SERPL-MCNC: 183 PG/ML
FERRITIN SERPL-MCNC: 22 NG/ML
FOLATE SERPL-MCNC: 16.6 NG/ML
GLUCOSE SERPL-MCNC: 95 MG/DL
GLUCOSE UR-MCNC: NORMAL
HBA1C MFR BLD HPLC: 5.4 %
HCG UR QL: 0.2 EU/DL
HCT VFR BLD CALC: 55.2 %
HDLC SERPL-MCNC: 36 MG/DL
HGB BLD-MCNC: 17.5 G/DL
HGB UR QL STRIP.AUTO: NORMAL
IMM GRANULOCYTES NFR BLD AUTO: 0 %
IRON SATN MFR SERPL: 23 %
IRON SERPL-MCNC: 93 UG/DL
KETONES UR-MCNC: NORMAL
LDLC SERPL CALC-MCNC: 156 MG/DL
LEUKOCYTE ESTERASE UR QL STRIP: NORMAL
LYMPHOCYTES # BLD AUTO: 2.27 K/UL
LYMPHOCYTES NFR BLD AUTO: 33.7 %
MAGNESIUM SERPL-MCNC: 2.1 MG/DL
MAN DIFF?: NORMAL
MCHC RBC-ENTMCNC: 28.6 PG
MCHC RBC-ENTMCNC: 31.7 GM/DL
MCV RBC AUTO: 90.3 FL
MONOCYTES # BLD AUTO: 0.61 K/UL
MONOCYTES NFR BLD AUTO: 9.1 %
NEUTROPHILS # BLD AUTO: 3.54 K/UL
NEUTROPHILS NFR BLD AUTO: 52.4 %
NITRITE UR QL STRIP: NORMAL
NONHDLC SERPL-MCNC: 193 MG/DL
PH UR STRIP: 5.5
PLATELET # BLD AUTO: 282 K/UL
POTASSIUM SERPL-SCNC: 4.5 MMOL/L
PROT SERPL-MCNC: 7.4 G/DL
PROT UR STRIP-MCNC: NORMAL
PSA SERPL-MCNC: 2.18 NG/ML
RBC # BLD: 6.11 M/UL
RBC # FLD: 14.6 %
SODIUM SERPL-SCNC: 140 MMOL/L
SP GR UR STRIP: 1.02
T3 SERPL-MCNC: 134 NG/DL
T3RU NFR SERPL: 1 TBI
T4 FREE SERPL-MCNC: 1.2 NG/DL
TESTOST SERPL-MCNC: 796 NG/DL
TIBC SERPL-MCNC: 408 UG/DL
TRIGL SERPL-MCNC: 183 MG/DL
TSH SERPL-ACNC: 3.76 UIU/ML
UIBC SERPL-MCNC: 314 UG/DL
VIT B12 SERPL-MCNC: 1250 PG/ML
WBC # FLD AUTO: 6.74 K/UL

## 2022-06-29 ENCOUNTER — APPOINTMENT (OUTPATIENT)
Dept: PHYSICAL MEDICINE AND REHAB | Facility: CLINIC | Age: 42
End: 2022-06-29

## 2022-06-29 VITALS — WEIGHT: 295 LBS | BODY MASS INDEX: 41.3 KG/M2 | HEIGHT: 71 IN

## 2022-06-29 PROCEDURE — 99443: CPT | Mod: 95

## 2022-06-29 NOTE — PLAN
[FreeTextEntry1] : Labs reviewed with patient during this encounter.\par \par Patient to continue with present medications. \par Patient to start Vitamin therapy as discussed during visit - See medications below. \par Increase fluid intake.. \par RTO for repeat labs in 3 months. \par RTO in 3 months for re-evaluation. \par Diet teaching performed in depth during this visit related to cholesterol. \par At least 25 minutes was spent with patient via video conference reviewing findings/results during this visit. Ample time was provided to answer any questions or address concerns to the patients satisfaction..\par \par Patient was advised that this audiovisual encounter constitutes a billable visit, and that the visit will be billed on the same terms and conditions as an in-office, face-to-face visit. Patient was further advised they may be responsible for any co-payment, co-insurance, or other self-payment they would normally pay for an office visit, unless such self-payment was waived by their insurance carrier.\par 		\par At this time the patient is not willing to begin medication therapy for Cholesterol and wishes to modify diet in attempt to correct levels. The risk of elevated lipids were discussed at length during this visit and patient states understanding..\par \par

## 2022-06-29 NOTE — HISTORY OF PRESENT ILLNESS
[de-identified] : Patient encounter today for follow up on cholesterol.  States he has been doing well.  Denies any CP, SOB or diff breathing.  No recent fever, chills, cough or cold type symptoms.  Has no other complaints at this time.\par \par  [FreeTextEntry1] : Cholesterol

## 2022-09-06 LAB
HCT VFR BLD CALC: 52.2 %
HGB BLD-MCNC: 16.6 G/DL
TESTOST SERPL-MCNC: 668 NG/DL

## 2022-09-07 ENCOUNTER — NON-APPOINTMENT (OUTPATIENT)
Age: 42
End: 2022-09-07

## 2022-09-27 ENCOUNTER — APPOINTMENT (OUTPATIENT)
Dept: PHYSICAL MEDICINE AND REHAB | Facility: CLINIC | Age: 42
End: 2022-09-27

## 2022-10-28 ENCOUNTER — NON-APPOINTMENT (OUTPATIENT)
Age: 42
End: 2022-10-28

## 2022-10-31 ENCOUNTER — APPOINTMENT (OUTPATIENT)
Dept: PHYSICAL MEDICINE AND REHAB | Facility: CLINIC | Age: 42
End: 2022-10-31

## 2022-10-31 VITALS
WEIGHT: 295 LBS | BODY MASS INDEX: 41.3 KG/M2 | TEMPERATURE: 98.1 F | OXYGEN SATURATION: 99 % | SYSTOLIC BLOOD PRESSURE: 124 MMHG | HEART RATE: 76 BPM | HEIGHT: 71 IN | DIASTOLIC BLOOD PRESSURE: 78 MMHG | RESPIRATION RATE: 16 BRPM

## 2022-10-31 PROCEDURE — 99213 OFFICE O/P EST LOW 20 MIN: CPT | Mod: 25

## 2022-10-31 PROCEDURE — 36410 VNPNXR 3YR/> PHY/QHP DX/THER: CPT

## 2022-10-31 NOTE — HISTORY OF PRESENT ILLNESS
[FreeTextEntry1] : CHolesterol and testosterone [de-identified] : Patient presents today for repeat evaluation on cholesterol. States he has been doing well. Denies any CP, SOB or diff breathing. No recent fever, chills, cough or cold type symptoms. Has no other complaints at this time.

## 2022-10-31 NOTE — PLAN
[FreeTextEntry1] : Labs Drawn by Dr. Sudeep Hendrix due to poor venous access.  Patient required lab testing due to conditions in their past medical history requiring periodic monitoring.  Labs were sent to [facility].\par Patient to continue with present medications. \par Increase fluid intake.. \par RTO in 7-10 days for re-eval.. \par At least 20 minutes was spent with patient face to face examining and reviewing findings/results during this visit.  Ample time was provided to answer any questions or address concerns to the patients satisfaction..\par

## 2022-11-02 LAB
25(OH)D3 SERPL-MCNC: 83.5 NG/ML
ALBUMIN SERPL ELPH-MCNC: 4.7 G/DL
ALP BLD-CCNC: 59 U/L
ALT SERPL-CCNC: 39 U/L
ANION GAP SERPL CALC-SCNC: 15 MMOL/L
AST SERPL-CCNC: 26 U/L
BASOPHILS # BLD AUTO: 0.09 K/UL
BASOPHILS NFR BLD AUTO: 1.4 %
BILIRUB SERPL-MCNC: 0.6 MG/DL
BUN SERPL-MCNC: 13 MG/DL
CALCIUM SERPL-MCNC: 9.8 MG/DL
CHLORIDE SERPL-SCNC: 103 MMOL/L
CHOLEST SERPL-MCNC: 210 MG/DL
CO2 SERPL-SCNC: 21 MMOL/L
CREAT SERPL-MCNC: 1.11 MG/DL
EGFR: 84 ML/MIN/1.73M2
EOSINOPHIL # BLD AUTO: 0.16 K/UL
EOSINOPHIL NFR BLD AUTO: 2.4 %
ESTIMATED AVERAGE GLUCOSE: 120 MG/DL
GLUCOSE SERPL-MCNC: 88 MG/DL
HBA1C MFR BLD HPLC: 5.8 %
HCT VFR BLD CALC: 46.9 %
HDLC SERPL-MCNC: 36 MG/DL
HGB BLD-MCNC: 15 G/DL
IMM GRANULOCYTES NFR BLD AUTO: 0.2 %
LDLC SERPL CALC-MCNC: 146 MG/DL
LYMPHOCYTES # BLD AUTO: 2.34 K/UL
LYMPHOCYTES NFR BLD AUTO: 35.5 %
MAN DIFF?: NORMAL
MCHC RBC-ENTMCNC: 26.8 PG
MCHC RBC-ENTMCNC: 32 GM/DL
MCV RBC AUTO: 83.8 FL
MONOCYTES # BLD AUTO: 0.55 K/UL
MONOCYTES NFR BLD AUTO: 8.3 %
NEUTROPHILS # BLD AUTO: 3.44 K/UL
NEUTROPHILS NFR BLD AUTO: 52.2 %
NONHDLC SERPL-MCNC: 174 MG/DL
PLATELET # BLD AUTO: 313 K/UL
POTASSIUM SERPL-SCNC: 4.3 MMOL/L
PROT SERPL-MCNC: 7.1 G/DL
PSA SERPL-MCNC: 2.38 NG/ML
RBC # BLD: 5.6 M/UL
RBC # FLD: 14.3 %
SODIUM SERPL-SCNC: 139 MMOL/L
TESTOST SERPL-MCNC: 695 NG/DL
TRIGL SERPL-MCNC: 140 MG/DL
WBC # FLD AUTO: 6.59 K/UL

## 2022-11-08 ENCOUNTER — APPOINTMENT (OUTPATIENT)
Dept: UROLOGY | Facility: CLINIC | Age: 42
End: 2022-11-08

## 2022-11-08 LAB — ESTROGEN SERPL-MCNC: 23 PG/ML

## 2022-11-09 ENCOUNTER — NON-APPOINTMENT (OUTPATIENT)
Age: 42
End: 2022-11-09

## 2022-11-10 ENCOUNTER — APPOINTMENT (OUTPATIENT)
Dept: PHYSICAL MEDICINE AND REHAB | Facility: CLINIC | Age: 42
End: 2022-11-10

## 2022-11-10 VITALS — BODY MASS INDEX: 41.3 KG/M2 | HEIGHT: 71 IN | WEIGHT: 295 LBS

## 2022-11-10 PROCEDURE — 99213 OFFICE O/P EST LOW 20 MIN: CPT | Mod: 95

## 2022-11-10 NOTE — HISTORY OF PRESENT ILLNESS
[Home] : at home, [unfilled] , at the time of the visit. [Medical Office: ([facility])___] : at the medical office located in  [Verbal consent obtained from patient] : the patient, [unfilled] [FreeTextEntry1] : cholesterol [de-identified] : Patient encounter today for follow up on cholesterol.  States he has been doing well.  Denies any CP, SOB or diff breathing.  No recent fever, chills, cough or cold type symptoms.  Has no other complaints at this time.\par \par

## 2022-12-06 ENCOUNTER — APPOINTMENT (OUTPATIENT)
Dept: UROLOGY | Facility: CLINIC | Age: 42
End: 2022-12-06

## 2023-01-03 ENCOUNTER — APPOINTMENT (OUTPATIENT)
Dept: UROLOGY | Facility: CLINIC | Age: 43
End: 2023-01-03
Payer: COMMERCIAL

## 2023-01-03 VITALS
SYSTOLIC BLOOD PRESSURE: 130 MMHG | TEMPERATURE: 98 F | RESPIRATION RATE: 16 BRPM | DIASTOLIC BLOOD PRESSURE: 81 MMHG | HEART RATE: 98 BPM

## 2023-01-03 PROCEDURE — 99214 OFFICE O/P EST MOD 30 MIN: CPT

## 2023-01-03 NOTE — ASSESSMENT
[FreeTextEntry1] : Reviewed records.\par Discussed labs. \par \par Benign Prostatic Hyperplasia:\par No bothersome lower urinary tract symptoms. \par \par Hypogonadism:\par Continue Testosterone cypionate 150 mg every week. \par Continue Anastrazole. \par Will get Labs. \par \par Asked to discuss with Endocrinologist regarding Mounjaro for weight loss.\par Discussed no issue from genitourinary standpoint with Patient being on Testosterone replacement therapy. \par Recommended to get labs every 3 months. \par \par Return to office in 6 months or sooner if any issues.

## 2023-01-03 NOTE — HISTORY OF PRESENT ILLNESS
[FreeTextEntry1] : 43 yo male presents for follow up. \par On Testosterone replacement therapy: Testosterone cypionate 150 mg every week.  \par Also takes Anastrozole. \par Does blood donation periodically. \par Normal libido, erections and ejaculation. \par Denies any difficulty with urination. \par Wants to go on Mounjaro for weight loss. \par \par With Xyosted had variable libido.\par \par Initially seen for low Testosterone. \par Has been feeling fatigued, low libido and has had 30 lbs weight gain in last 3 months. \par Rated erections as 4/5. \par Denied any difficulty with urination. \par Denied dysuria, hematuria, lower abdominal or flank pain, fever, chills or rigors. \par No family history of Prostate cancer.

## 2023-01-03 NOTE — PHYSICAL EXAM
[Prostate Tenderness] : the prostate was not tender [No Prostate Nodules] : no prostate nodules [Prostate Size ___ (0-4)] : prostate size [unfilled] (scale: 0-4) [Normal Appearance] : normal appearance [General Appearance - In No Acute Distress] : no acute distress [Abdomen Soft] : soft [Abdomen Tenderness] : non-tender [FreeTextEntry1] : normal peripheral circulation  [] : no respiratory distress [Oriented To Time, Place, And Person] : oriented to person, place, and time [Normal Station and Gait] : the gait and station were normal for the patient's age

## 2023-01-09 RX ORDER — MONTELUKAST 10 MG/1
10 TABLET, FILM COATED ORAL
Refills: 0 | Status: COMPLETED | COMMUNITY
End: 2023-01-09

## 2023-01-09 RX ORDER — GUAIFENESIN 1200 MG/1
500 TABLET, EXTENDED RELEASE ORAL
Refills: 0 | Status: COMPLETED | COMMUNITY
End: 2023-01-09

## 2023-01-09 RX ORDER — ALBUTEROL 90 MCG
AEROSOL (GRAM) INHALATION
Refills: 0 | Status: COMPLETED | COMMUNITY
End: 2023-01-09

## 2023-01-09 RX ORDER — MONTELUKAST SODIUM 10 MG/1
TABLET, FILM COATED ORAL
Refills: 0 | Status: DISCONTINUED | COMMUNITY
End: 2023-01-09

## 2023-01-09 RX ORDER — MULTIVITAMIN
TABLET ORAL
Refills: 0 | Status: COMPLETED | COMMUNITY
End: 2023-01-09

## 2023-02-01 ENCOUNTER — APPOINTMENT (OUTPATIENT)
Dept: PHYSICAL MEDICINE AND REHAB | Facility: CLINIC | Age: 43
End: 2023-02-01
Payer: COMMERCIAL

## 2023-02-01 VITALS
TEMPERATURE: 97.5 F | SYSTOLIC BLOOD PRESSURE: 126 MMHG | HEART RATE: 82 BPM | HEIGHT: 71 IN | DIASTOLIC BLOOD PRESSURE: 74 MMHG | RESPIRATION RATE: 16 BRPM | OXYGEN SATURATION: 98 % | WEIGHT: 295 LBS | BODY MASS INDEX: 41.3 KG/M2

## 2023-02-01 PROCEDURE — 99214 OFFICE O/P EST MOD 30 MIN: CPT | Mod: 25

## 2023-02-01 PROCEDURE — 36410 VNPNXR 3YR/> PHY/QHP DX/THER: CPT

## 2023-02-01 NOTE — HISTORY OF PRESENT ILLNESS
[FreeTextEntry1] : Cholesterol [de-identified] : Patient encounter today for re-evaluation of cholesterol and low testosterone.  States he has been doing well.  Denies any CP, SOB or diff breathing.  No recent fever, chills, cough or cold type symptoms.  Has no other complaints at this time.\par \par

## 2023-02-01 NOTE — PLAN
[FreeTextEntry1] : \par Labs Drawn by Dr. Sudeep Hendrix due to poor venous access.  Patient required lab testing due to conditions in their past medical history requiring periodic monitoring.  Labs were sent to [email or web address]par \par \par Discussed and reviewed current medications as follows; \par Lisinopril for HTN, Testosterone Cypionate for low testosterone, Ventolin for asthma\par Patient to continue with present medications - all medications reconciled/reviewed during this visit and listed above.  \par Increase fluid intake.\par RTO in 7-10 days for re-evaluation.\par At least 30 minutes was spent with patient face to face examining and reviewing findings/results during this visit. Ample time was provided to answer any questions or address concerns to the patients satisfaction.\par \par The documentation recorded by the scribe, in my presence, accurately reflects the service I personally performed, and the decisions made by me with my edits as appropriate.\par Sudeep Hendrix DNP\par \par I, Karly Gee, attest that this documentation has been prepared under the direction and in the presence of Provider Sudeep Hendrix DNP\par

## 2023-02-06 LAB
25(OH)D3 SERPL-MCNC: 63.9 NG/ML
ALBUMIN SERPL ELPH-MCNC: 4.7 G/DL
ALP BLD-CCNC: 65 U/L
ALT SERPL-CCNC: 46 U/L
ANION GAP SERPL CALC-SCNC: 12 MMOL/L
AST SERPL-CCNC: 27 U/L
BASOPHILS # BLD AUTO: 0.08 K/UL
BASOPHILS NFR BLD AUTO: 1.3 %
BILIRUB SERPL-MCNC: 0.6 MG/DL
BUN SERPL-MCNC: 18 MG/DL
CALCIUM SERPL-MCNC: 10.2 MG/DL
CHLORIDE SERPL-SCNC: 101 MMOL/L
CHOLEST SERPL-MCNC: 242 MG/DL
CO2 SERPL-SCNC: 22 MMOL/L
CREAT SERPL-MCNC: 1.19 MG/DL
EGFR: 77 ML/MIN/1.73M2
EOSINOPHIL # BLD AUTO: 0.21 K/UL
EOSINOPHIL NFR BLD AUTO: 3.3 %
GLUCOSE SERPL-MCNC: 100 MG/DL
HCT VFR BLD CALC: 52.4 %
HDLC SERPL-MCNC: 38 MG/DL
HGB BLD-MCNC: 16.4 G/DL
IMM GRANULOCYTES NFR BLD AUTO: 0.2 %
LDLC SERPL CALC-MCNC: 172 MG/DL
LYMPHOCYTES # BLD AUTO: 1.88 K/UL
LYMPHOCYTES NFR BLD AUTO: 29.8 %
MAN DIFF?: NORMAL
MCHC RBC-ENTMCNC: 25.7 PG
MCHC RBC-ENTMCNC: 31.3 GM/DL
MCV RBC AUTO: 82.1 FL
MONOCYTES # BLD AUTO: 0.55 K/UL
MONOCYTES NFR BLD AUTO: 8.7 %
NEUTROPHILS # BLD AUTO: 3.57 K/UL
NEUTROPHILS NFR BLD AUTO: 56.7 %
NONHDLC SERPL-MCNC: 204 MG/DL
PLATELET # BLD AUTO: 300 K/UL
POTASSIUM SERPL-SCNC: 4.4 MMOL/L
PROT SERPL-MCNC: 7.6 G/DL
PSA SERPL-MCNC: 2.67 NG/ML
RBC # BLD: 6.38 M/UL
RBC # FLD: 18.6 %
SODIUM SERPL-SCNC: 136 MMOL/L
TESTOST SERPL-MCNC: 923 NG/DL
TRIGL SERPL-MCNC: 162 MG/DL
WBC # FLD AUTO: 6.3 K/UL

## 2023-02-07 LAB — ESTROGEN SERPL-MCNC: 135 PG/ML

## 2023-02-09 ENCOUNTER — APPOINTMENT (OUTPATIENT)
Dept: PHYSICAL MEDICINE AND REHAB | Facility: CLINIC | Age: 43
End: 2023-02-09
Payer: COMMERCIAL

## 2023-02-09 VITALS — WEIGHT: 295 LBS | HEIGHT: 71 IN | BODY MASS INDEX: 41.3 KG/M2

## 2023-02-09 PROCEDURE — 99213 OFFICE O/P EST LOW 20 MIN: CPT | Mod: 95

## 2023-02-09 NOTE — HISTORY OF PRESENT ILLNESS
[FreeTextEntry1] : Cholesterol [de-identified] : Patient encounter today for re-evaluation of cholesterol.  States he has been doing well.  Denies any CP, SOB or diff breathing.  No recent fever, chills, cough or cold type symptoms.  Has no other complaints at this time.\par \par

## 2023-02-09 NOTE — PLAN
[FreeTextEntry1] : Labs reviewed with patient during this encounter.\par \par Patient to continue with present medications - all medications reconciled/reviewed during this visit and listed above\par Patient to start Vitamin therapy as discussed during visit\par Increase fluid intake.. \par RTO for repeat labs in 6 months. \par RTO in 6 months for re-evaluation. \par Diet teaching for at least 8 minutes.performed in depth during this visit related to cholesterol and cardiovascular risks.\par At least 25 minutes was spent with patient via video conference reviewing findings/results during this visit. Ample time was provided to answer any questions or address concerns to the patients satisfaction..\par \par Patient was advised that this audiovisual encounter constitutes a billable visit, and that the visit will be billed on the same terms and conditions as an in-office, face-to-face visit. Patient was further advised they may be responsible for any co-payment, co-insurance, or other self-payment they would normally pay for an office visit, unless such self-payment was waived by their insurance carrier.\par 		\par At this time the patient is not willing to begin medication therapy for Cholesterol and wishes to modify diet in attempt to correct levels. The risk of elevated lipids were discussed at length during this visit and patient states understanding..

## 2023-02-14 DIAGNOSIS — R79.89 OTHER SPECIFIED ABNORMAL FINDINGS OF BLOOD CHEMISTRY: ICD-10-CM

## 2023-03-22 LAB — TESTOST SERPL-MCNC: 710 NG/DL

## 2023-06-01 ENCOUNTER — NON-APPOINTMENT (OUTPATIENT)
Age: 43
End: 2023-06-01

## 2023-06-02 ENCOUNTER — NON-APPOINTMENT (OUTPATIENT)
Age: 43
End: 2023-06-02

## 2023-07-11 ENCOUNTER — APPOINTMENT (OUTPATIENT)
Dept: UROLOGY | Facility: CLINIC | Age: 43
End: 2023-07-11

## 2023-07-12 LAB
HCT VFR BLD CALC: 50.2 %
HGB BLD-MCNC: 15.7 G/DL
TESTOST FREE SERPL-MCNC: 7.9 PG/ML
TESTOST SERPL-MCNC: 543 NG/DL

## 2023-08-11 NOTE — END OF VISIT
[Time Spent: ___ minutes] : I have spent [unfilled] minutes of time on the encounter.
no abrasions, no jaundice, no lesions, no pruritis, and no rashes.

## 2023-09-19 ENCOUNTER — APPOINTMENT (OUTPATIENT)
Dept: UROLOGY | Facility: CLINIC | Age: 43
End: 2023-09-19
Payer: COMMERCIAL

## 2023-09-19 VITALS
HEART RATE: 86 BPM | HEIGHT: 71 IN | SYSTOLIC BLOOD PRESSURE: 125 MMHG | DIASTOLIC BLOOD PRESSURE: 83 MMHG | BODY MASS INDEX: 39.2 KG/M2 | WEIGHT: 280 LBS

## 2023-09-19 PROCEDURE — 99213 OFFICE O/P EST LOW 20 MIN: CPT

## 2023-09-28 ENCOUNTER — NON-APPOINTMENT (OUTPATIENT)
Age: 43
End: 2023-09-28

## 2023-09-28 RX ORDER — TESTOSTERONE CYPIONATE 200 MG/ML
200 INJECTION, SOLUTION INTRAMUSCULAR
Qty: 10 | Refills: 0 | Status: DISCONTINUED | COMMUNITY
Start: 2023-06-02 | End: 2023-09-28

## 2023-10-02 RX ORDER — TESTOSTERONE ENANTHATE 75 MG/.5ML
75 INJECTION SUBCUTANEOUS
Refills: 0 | Status: DISCONTINUED | COMMUNITY
End: 2023-10-02

## 2023-10-05 RX ORDER — TESTOSTERONE CYPIONATE 100 MG/ML
100 INJECTION, SOLUTION INTRAMUSCULAR
Qty: 20 | Refills: 0 | Status: DISCONTINUED | COMMUNITY
Start: 2021-02-23 | End: 2023-10-05

## 2023-12-05 ENCOUNTER — APPOINTMENT (OUTPATIENT)
Dept: PHYSICAL MEDICINE AND REHAB | Facility: CLINIC | Age: 43
End: 2023-12-05
Payer: COMMERCIAL

## 2023-12-05 ENCOUNTER — NON-APPOINTMENT (OUTPATIENT)
Age: 43
End: 2023-12-05

## 2023-12-05 ENCOUNTER — LABORATORY RESULT (OUTPATIENT)
Age: 43
End: 2023-12-05

## 2023-12-05 VITALS
DIASTOLIC BLOOD PRESSURE: 88 MMHG | WEIGHT: 305 LBS | SYSTOLIC BLOOD PRESSURE: 134 MMHG | HEART RATE: 82 BPM | TEMPERATURE: 97.8 F | RESPIRATION RATE: 16 BRPM | OXYGEN SATURATION: 97 % | HEIGHT: 71 IN | BODY MASS INDEX: 42.7 KG/M2

## 2023-12-05 DIAGNOSIS — Z00.00 ENCOUNTER FOR GENERAL ADULT MEDICAL EXAMINATION W/OUT ABNORMAL FINDINGS: ICD-10-CM

## 2023-12-05 DIAGNOSIS — N40.0 BENIGN PROSTATIC HYPERPLASIA WITHOUT LOWER URINARY TRACT SYMPMS: ICD-10-CM

## 2023-12-05 PROCEDURE — 36410 VNPNXR 3YR/> PHY/QHP DX/THER: CPT

## 2023-12-05 PROCEDURE — G0444 DEPRESSION SCREEN ANNUAL: CPT | Mod: 33,59

## 2023-12-05 PROCEDURE — 99396 PREV VISIT EST AGE 40-64: CPT | Mod: 25

## 2023-12-05 PROCEDURE — 81003 URINALYSIS AUTO W/O SCOPE: CPT | Mod: QW

## 2023-12-05 PROCEDURE — 93000 ELECTROCARDIOGRAM COMPLETE: CPT | Mod: 59

## 2023-12-12 LAB
25(OH)D3 SERPL-MCNC: 113 NG/ML
ALBUMIN SERPL ELPH-MCNC: 4.7 G/DL
ALP BLD-CCNC: 61 U/L
ALT SERPL-CCNC: 54 U/L
ANION GAP SERPL CALC-SCNC: 10 MMOL/L
AST SERPL-CCNC: 51 U/L
B BURGDOR AB SER-IMP: NEGATIVE
B BURGDOR IGG+IGM SER QL: 0.3 INDEX
BASOPHILS # BLD AUTO: 0.09 K/UL
BASOPHILS NFR BLD AUTO: 1.2 %
BILIRUB SERPL-MCNC: 0.5 MG/DL
BUN SERPL-MCNC: 13 MG/DL
CALCIUM SERPL-MCNC: 9.9 MG/DL
CHLORIDE SERPL-SCNC: 102 MMOL/L
CHOLEST SERPL-MCNC: 227 MG/DL
CO2 SERPL-SCNC: 25 MMOL/L
CREAT SERPL-MCNC: 1.16 MG/DL
EGFR: 79 ML/MIN/1.73M2
EOSINOPHIL # BLD AUTO: 0.26 K/UL
EOSINOPHIL NFR BLD AUTO: 3.5 %
ESTIMATED AVERAGE GLUCOSE: 114 MG/DL
ESTROGEN SERPL-MCNC: 192 PG/ML
FERRITIN SERPL-MCNC: 15 NG/ML
FOLATE SERPL-MCNC: >20 NG/ML
GLUCOSE SERPL-MCNC: 80 MG/DL
HBA1C MFR BLD HPLC: 5.6 %
HCT VFR BLD CALC: 51.1 %
HDLC SERPL-MCNC: 39 MG/DL
HGB BLD-MCNC: 16 G/DL
IMM GRANULOCYTES NFR BLD AUTO: 0.1 %
IRON SATN MFR SERPL: 11 %
IRON SERPL-MCNC: 47 UG/DL
LDLC SERPL CALC-MCNC: 158 MG/DL
LYMPHOCYTES # BLD AUTO: 2 K/UL
LYMPHOCYTES NFR BLD AUTO: 27.1 %
MAGNESIUM SERPL-MCNC: 2 MG/DL
MAN DIFF?: NORMAL
MCHC RBC-ENTMCNC: 25.6 PG
MCHC RBC-ENTMCNC: 31.3 GM/DL
MCV RBC AUTO: 81.6 FL
MONOCYTES # BLD AUTO: 0.67 K/UL
MONOCYTES NFR BLD AUTO: 9.1 %
NEUTROPHILS # BLD AUTO: 4.35 K/UL
NEUTROPHILS NFR BLD AUTO: 59 %
NONHDLC SERPL-MCNC: 187 MG/DL
PLATELET # BLD AUTO: 290 K/UL
POTASSIUM SERPL-SCNC: 4.9 MMOL/L
PROT SERPL-MCNC: 7.5 G/DL
PSA SERPL-MCNC: 2.61 NG/ML
RBC # BLD: 6.26 M/UL
RBC # FLD: 17.8 %
SODIUM SERPL-SCNC: 136 MMOL/L
T3 SERPL-MCNC: 157 NG/DL
T3RU NFR SERPL: 1 TBI
T4 FREE SERPL-MCNC: 1.3 NG/DL
TESTOST SERPL-MCNC: 1082 NG/DL
TIBC SERPL-MCNC: 432 UG/DL
TRIGL SERPL-MCNC: 159 MG/DL
TSH SERPL-ACNC: 2.16 UIU/ML
UIBC SERPL-MCNC: 385 UG/DL
VIT B12 SERPL-MCNC: 1252 PG/ML
WBC # FLD AUTO: 7.38 K/UL

## 2023-12-19 ENCOUNTER — APPOINTMENT (OUTPATIENT)
Dept: PHYSICAL MEDICINE AND REHAB | Facility: CLINIC | Age: 43
End: 2023-12-19
Payer: COMMERCIAL

## 2023-12-19 VITALS — WEIGHT: 305 LBS | BODY MASS INDEX: 42.7 KG/M2 | HEIGHT: 71 IN

## 2023-12-19 DIAGNOSIS — J45.909 UNSPECIFIED ASTHMA, UNCOMPLICATED: ICD-10-CM

## 2023-12-19 PROCEDURE — 99213 OFFICE O/P EST LOW 20 MIN: CPT | Mod: 95

## 2023-12-27 RX ORDER — SYRINGE, DISPOSABLE, 1 ML
22G X 1" SYRINGE, EMPTY DISPOSABLE MISCELLANEOUS
Qty: 10 | Refills: 5 | Status: COMPLETED | COMMUNITY
Start: 2022-05-03 | End: 2023-12-27

## 2023-12-27 RX ORDER — NEEDLES, DISPOSABLE 25GX5/8"
22G X 1-1/2" NEEDLE, DISPOSABLE MISCELLANEOUS
Qty: 12 | Refills: 3 | Status: COMPLETED | COMMUNITY
Start: 2022-05-03 | End: 2023-12-27

## 2023-12-28 ENCOUNTER — NON-APPOINTMENT (OUTPATIENT)
Age: 43
End: 2023-12-28

## 2023-12-28 LAB
ALBUMIN SERPL ELPH-MCNC: 4.7 G/DL
ALP BLD-CCNC: 58 U/L
ALT SERPL-CCNC: 47 U/L
ANION GAP SERPL CALC-SCNC: 14 MMOL/L
AST SERPL-CCNC: 31 U/L
BILIRUB SERPL-MCNC: 0.6 MG/DL
BUN SERPL-MCNC: 15 MG/DL
CALCIUM SERPL-MCNC: 9.6 MG/DL
CHLORIDE SERPL-SCNC: 104 MMOL/L
CHOLEST SERPL-MCNC: 216 MG/DL
CO2 SERPL-SCNC: 25 MMOL/L
CREAT SERPL-MCNC: 1.24 MG/DL
EGFR: 73 ML/MIN/1.73M2
ESTRADIOL SERPL-MCNC: <5 PG/ML
GLUCOSE SERPL-MCNC: 109 MG/DL
HCT VFR BLD CALC: 47.2 %
HDLC SERPL-MCNC: 33 MG/DL
HGB BLD-MCNC: 14.6 G/DL
LDLC SERPL CALC-MCNC: 143 MG/DL
MCHC RBC-ENTMCNC: 25.1 PG
MCHC RBC-ENTMCNC: 30.9 GM/DL
MCV RBC AUTO: 81.1 FL
NONHDLC SERPL-MCNC: 183 MG/DL
PLATELET # BLD AUTO: 343 K/UL
POTASSIUM SERPL-SCNC: 4.1 MMOL/L
PROT SERPL-MCNC: 7.1 G/DL
PSA SERPL-MCNC: 2.17 NG/ML
RBC # BLD: 5.82 M/UL
RBC # FLD: 17.5 %
SODIUM SERPL-SCNC: 143 MMOL/L
TESTOST SERPL-MCNC: 607 NG/DL
TRIGL SERPL-MCNC: 222 MG/DL
WBC # FLD AUTO: 7.68 K/UL

## 2024-01-09 LAB
BILIRUB UR QL STRIP: NEGATIVE
COLLECTION METHOD: NORMAL
GLUCOSE UR-MCNC: NEGATIVE
HCG UR QL: 0.2 EU/DL
HGB UR QL STRIP.AUTO: NEGATIVE
KETONES UR-MCNC: NEGATIVE
LEUKOCYTE ESTERASE UR QL STRIP: NEGATIVE
NITRITE UR QL STRIP: NEGATIVE
PH UR STRIP: 7
PROT UR STRIP-MCNC: NEGATIVE
SP GR UR STRIP: 1.02

## 2024-01-24 RX ORDER — ANASTROZOLE TABLETS 1 MG/1
1 TABLET ORAL
Qty: 30 | Refills: 3 | Status: ACTIVE | COMMUNITY
Start: 2020-07-31 | End: 1900-01-01

## 2024-01-24 RX ORDER — ALBUTEROL SULFATE 90 UG/1
108 (90 BASE) AEROSOL, METERED RESPIRATORY (INHALATION)
Qty: 3 | Refills: 5 | Status: ACTIVE | COMMUNITY
Start: 1900-01-01 | End: 1900-01-01

## 2024-01-24 RX ORDER — MONTELUKAST 10 MG/1
10 TABLET, FILM COATED ORAL DAILY
Qty: 90 | Refills: 3 | Status: ACTIVE | COMMUNITY
Start: 1900-01-01 | End: 1900-01-01

## 2024-03-21 ENCOUNTER — APPOINTMENT (OUTPATIENT)
Dept: PHYSICAL MEDICINE AND REHAB | Facility: CLINIC | Age: 44
End: 2024-03-21
Payer: COMMERCIAL

## 2024-03-21 VITALS
TEMPERATURE: 97.8 F | BODY MASS INDEX: 43.12 KG/M2 | SYSTOLIC BLOOD PRESSURE: 130 MMHG | RESPIRATION RATE: 16 BRPM | HEIGHT: 71 IN | DIASTOLIC BLOOD PRESSURE: 84 MMHG | WEIGHT: 308 LBS | OXYGEN SATURATION: 97 % | HEART RATE: 84 BPM

## 2024-03-21 DIAGNOSIS — J45.909 UNSPECIFIED ASTHMA, UNCOMPLICATED: ICD-10-CM

## 2024-03-21 PROCEDURE — 99213 OFFICE O/P EST LOW 20 MIN: CPT

## 2024-03-21 PROCEDURE — 36410 VNPNXR 3YR/> PHY/QHP DX/THER: CPT

## 2024-03-21 RX ORDER — TIRZEPATIDE 5 MG/.5ML
5 INJECTION, SOLUTION SUBCUTANEOUS
Qty: 4 | Refills: 0 | Status: ACTIVE | COMMUNITY
Start: 2024-03-21

## 2024-03-21 NOTE — PLAN
[FreeTextEntry1] : Labs Drawn by Dr. Sudeep Hendrix due to poor venous access.  Patient required lab testing due to conditions in their past medical history requiring periodic monitoring.  Labs were sent to [facility].  Patient to continue with present medications - all medications reconciled/reviewed during this visit and listed above.  Increase fluid intake..  RTO in 7-10 days for re-eval..  At least 20 minutes was spent with patient face to face examining and reviewing findings/results during this visit.  Ample time was provided to answer any questions or address concerns to the patients satisfaction..

## 2024-03-21 NOTE — PHYSICAL EXAM
[No Acute Distress] : no acute distress [Well Nourished] : well nourished [Well Developed] : well developed [Well-Appearing] : well-appearing [Normal Sclera/Conjunctiva] : normal sclera/conjunctiva [PERRL] : pupils equal round and reactive to light [EOMI] : extraocular movements intact [Normal Oropharynx] : the oropharynx was normal [Normal Outer Ear/Nose] : the outer ears and nose were normal in appearance [No JVD] : no jugular venous distention [Supple] : supple [No Lymphadenopathy] : no lymphadenopathy [Thyroid Normal, No Nodules] : the thyroid was normal and there were no nodules present [No Respiratory Distress] : no respiratory distress  [Clear to Auscultation] : lungs were clear to auscultation bilaterally [No Accessory Muscle Use] : no accessory muscle use [Normal Rate] : normal rate  [Regular Rhythm] : with a regular rhythm [No Murmur] : no murmur heard [Normal S1, S2] : normal S1 and S2 [No Abdominal Bruit] : a ~M bruit was not heard ~T in the abdomen [No Carotid Bruits] : no carotid bruits [No Varicosities] : no varicosities [No Edema] : there was no peripheral edema [Pedal Pulses Present] : the pedal pulses are present [No Extremity Clubbing/Cyanosis] : no extremity clubbing/cyanosis [No Palpable Aorta] : no palpable aorta [Soft] : abdomen soft [Non Tender] : non-tender [Non-distended] : non-distended [No Masses] : no abdominal mass palpated [No HSM] : no HSM [Normal Bowel Sounds] : normal bowel sounds [Normal Anterior Cervical Nodes] : no anterior cervical lymphadenopathy [Normal Posterior Cervical Nodes] : no posterior cervical lymphadenopathy [No CVA Tenderness] : no CVA  tenderness [No Spinal Tenderness] : no spinal tenderness [No Joint Swelling] : no joint swelling [Grossly Normal Strength/Tone] : grossly normal strength/tone [No Rash] : no rash [Coordination Grossly Intact] : coordination grossly intact [No Focal Deficits] : no focal deficits [Normal Gait] : normal gait [Deep Tendon Reflexes (DTR)] : deep tendon reflexes were 2+ and symmetric [Normal Affect] : the affect was normal [Normal Insight/Judgement] : insight and judgment were intact

## 2024-03-21 NOTE — HISTORY OF PRESENT ILLNESS
[FreeTextEntry1] : Cholesterol [de-identified] : Patient presents today for repeat evaluation on testosterone and cholesterol. States he has been doing well. Denies any CP, SOB or diff breathing. No recent fever, chills, cough or cold type symptoms. Has no other complaints at this time.

## 2024-03-25 LAB
25(OH)D3 SERPL-MCNC: 107 NG/ML
ALBUMIN SERPL ELPH-MCNC: 4.8 G/DL
ALP BLD-CCNC: 65 U/L
ALT SERPL-CCNC: 43 U/L
ANION GAP SERPL CALC-SCNC: 16 MMOL/L
AST SERPL-CCNC: 32 U/L
BASOPHILS # BLD AUTO: 0.07 K/UL
BASOPHILS NFR BLD AUTO: 0.9 %
BILIRUB SERPL-MCNC: 0.7 MG/DL
BUN SERPL-MCNC: 15 MG/DL
CALCIUM SERPL-MCNC: 9.5 MG/DL
CHLORIDE SERPL-SCNC: 99 MMOL/L
CHOLEST SERPL-MCNC: 244 MG/DL
CO2 SERPL-SCNC: 22 MMOL/L
CREAT SERPL-MCNC: 1.45 MG/DL
EGFR: 60 ML/MIN/1.73M2
EOSINOPHIL # BLD AUTO: 0.18 K/UL
EOSINOPHIL NFR BLD AUTO: 2.2 %
ESTIMATED AVERAGE GLUCOSE: 111 MG/DL
GLUCOSE SERPL-MCNC: 79 MG/DL
HBA1C MFR BLD HPLC: 5.5 %
HCT VFR BLD CALC: 51.6 %
HDLC SERPL-MCNC: 37 MG/DL
HGB BLD-MCNC: 16.6 G/DL
IMM GRANULOCYTES NFR BLD AUTO: 0.1 %
LDLC SERPL CALC-MCNC: 180 MG/DL
LYMPHOCYTES # BLD AUTO: 2.37 K/UL
LYMPHOCYTES NFR BLD AUTO: 28.8 %
MAN DIFF?: NORMAL
MCHC RBC-ENTMCNC: 26.3 PG
MCHC RBC-ENTMCNC: 32.2 GM/DL
MCV RBC AUTO: 81.9 FL
MONOCYTES # BLD AUTO: 0.75 K/UL
MONOCYTES NFR BLD AUTO: 9.1 %
NEUTROPHILS # BLD AUTO: 4.85 K/UL
NEUTROPHILS NFR BLD AUTO: 58.9 %
NONHDLC SERPL-MCNC: 207 MG/DL
PLATELET # BLD AUTO: 299 K/UL
POTASSIUM SERPL-SCNC: 4.5 MMOL/L
PROT SERPL-MCNC: 7.5 G/DL
PSA SERPL-MCNC: 2.88 NG/ML
RBC # BLD: 6.3 M/UL
RBC # FLD: 19.8 %
SODIUM SERPL-SCNC: 137 MMOL/L
TESTOST SERPL-MCNC: 822 NG/DL
TRIGL SERPL-MCNC: 146 MG/DL
WBC # FLD AUTO: 8.23 K/UL

## 2024-03-26 ENCOUNTER — APPOINTMENT (OUTPATIENT)
Dept: UROLOGY | Facility: CLINIC | Age: 44
End: 2024-03-26

## 2024-03-28 ENCOUNTER — APPOINTMENT (OUTPATIENT)
Dept: PHYSICAL MEDICINE AND REHAB | Facility: CLINIC | Age: 44
End: 2024-03-28
Payer: COMMERCIAL

## 2024-03-28 VITALS — BODY MASS INDEX: 43.12 KG/M2 | HEIGHT: 71 IN | WEIGHT: 308 LBS

## 2024-03-28 DIAGNOSIS — E55.9 VITAMIN D DEFICIENCY, UNSPECIFIED: ICD-10-CM

## 2024-03-28 DIAGNOSIS — E78.00 PURE HYPERCHOLESTEROLEMIA, UNSPECIFIED: ICD-10-CM

## 2024-03-28 DIAGNOSIS — I10 ESSENTIAL (PRIMARY) HYPERTENSION: ICD-10-CM

## 2024-03-28 DIAGNOSIS — E66.9 OBESITY, UNSPECIFIED: ICD-10-CM

## 2024-03-28 PROCEDURE — 99213 OFFICE O/P EST LOW 20 MIN: CPT

## 2024-03-28 RX ORDER — LISINOPRIL 20 MG/1
20 TABLET ORAL DAILY
Qty: 90 | Refills: 1 | Status: ACTIVE | COMMUNITY
Start: 2022-04-12

## 2024-03-28 NOTE — HISTORY OF PRESENT ILLNESS
[Home] : at home, [unfilled] , at the time of the visit. [Medical Office: ([facility])___] : at the medical office located in  [Verbal consent obtained from patient] : the patient, [unfilled] [FreeTextEntry1] : Cholesterol [de-identified] : Patient encounter today for re-evaluation of cholesterol.  States he has been doing well.  Denies any CP, SOB or diff breathing.  No recent fever, chills, cough or cold type symptoms.  Has no other complaints at this time.

## 2024-03-28 NOTE — PLAN
[FreeTextEntry1] : Labs reviewed with patient during this encounter.  Chol = 244  Patient to continue with present medications - all medications reconciled/reviewed during this visit and listed above Patient to start Vitamin therapy as discussed during visit Increase fluid intake..  RTO for repeat labs in 6 months.  RTO in 6 months for re-evaluation.  Diet teaching for at least 8 minutes.performed in depth during this visit related to cholesterol and cardiovascular risks. At least 25 minutes was spent with patient via video conference reviewing findings/results during this visit. Ample time was provided to answer any questions or address concerns to the patients satisfaction..  Patient was advised that this audiovisual encounter constitutes a billable visit, and that the visit will be billed on the same terms and conditions as an in-office, face-to-face visit. Patient was further advised they may be responsible for any co-payment, co-insurance, or other self-payment they would normally pay for an office visit, unless such self-payment was waived by their insurance carrier. 		 At this time the patient is not willing to begin medication therapy for Cholesterol and wishes to modify diet in attempt to correct levels. The risk of elevated lipids were discussed at length during this visit and patient states understanding..

## 2024-03-29 ENCOUNTER — NON-APPOINTMENT (OUTPATIENT)
Age: 44
End: 2024-03-29

## 2024-04-02 LAB — ESTROGEN SERPL-MCNC: 187 PG/ML

## 2024-04-09 ENCOUNTER — APPOINTMENT (OUTPATIENT)
Dept: UROLOGY | Facility: CLINIC | Age: 44
End: 2024-04-09
Payer: COMMERCIAL

## 2024-04-09 VITALS
SYSTOLIC BLOOD PRESSURE: 125 MMHG | WEIGHT: 302 LBS | RESPIRATION RATE: 16 BRPM | BODY MASS INDEX: 42.28 KG/M2 | DIASTOLIC BLOOD PRESSURE: 86 MMHG | HEART RATE: 96 BPM | HEIGHT: 71 IN

## 2024-04-09 DIAGNOSIS — E29.1 TESTICULAR HYPOFUNCTION: ICD-10-CM

## 2024-04-09 DIAGNOSIS — N52.9 MALE ERECTILE DYSFUNCTION, UNSPECIFIED: ICD-10-CM

## 2024-04-09 PROCEDURE — 99214 OFFICE O/P EST MOD 30 MIN: CPT

## 2024-04-09 RX ORDER — TADALAFIL 5 MG/1
5 TABLET ORAL
Qty: 90 | Refills: 1 | Status: ACTIVE | COMMUNITY
Start: 2024-04-09 | End: 1900-01-01

## 2024-04-09 NOTE — ASSESSMENT
[FreeTextEntry1] : Reviewed records. Discussed labs.   Hypogonadism: Discussed treatment options.  Will continue testosterone cypionate 150 mg / 0.75 ml every week. Repeat labs: Total testosterone, hemoglobin and hematocrit in 3 months. Recommended do blood donation.  Erectile dysfunction: Discussed treatment options. Wants to try Cialis/Tadalafil.  Will take Tadalafil 5 mg daily. The patient understands that these medications are not initiators of erection and that he will still require sexual stimulation. He was advised to take the medication 30 to 60 minutes prior to sexual activity and that the efficacy of the medication is decreased following a high-fat meal or concurrent use of alcohol. Explained the common adverse effects of therapy including, but not limited to headache, flushing, upset stomach, nasal congestion, abnormal vision, back pain, and myalgia. Asked patient to stop taking the medicine if he develops chest pain, visual or auditory disturbance. Explained priapism: if erection does not go down after ejaculation or lasts more than 4 hours to present to emergency room.   Return to office in 6 months or sooner if any issues.

## 2024-04-09 NOTE — HISTORY OF PRESENT ILLNESS
[FreeTextEntry1] : 44-year-old male present for follow-up.  On testosterone replacement therapy: testosterone cypionate 150mg/0.75ml of 200mg per ml concentration every week.  Also takes Anastrozole, every third day.  Per patient PCP and cardiologist recommending twice weekly instead of weekly testosterone injections. Complaining of on and off difficulty attaining and maintaining erections. Denies any difficulty with urination.  On Zepbound for last 1 month and has lost 5 pounds.  With Xyosted had variable libido.  Initially seen for low Testosterone.  Has been feeling fatigued, low libido and has had 30 lbs weight gain in last 3 months.  Rated erections as 4/5.  Denied any difficulty with urination.  Denied dysuria, hematuria, lower abdominal or flank pain, fever, chills or rigors.  No family history of Prostate cancer.

## 2024-04-29 RX ORDER — FLUTICASONE FUROATE 200 UG/1
200 POWDER RESPIRATORY (INHALATION) DAILY
Qty: 3 | Refills: 3 | Status: ACTIVE | COMMUNITY
Start: 2022-04-12 | End: 1900-01-01

## 2024-06-18 ENCOUNTER — NON-APPOINTMENT (OUTPATIENT)
Age: 44
End: 2024-06-18

## 2024-06-19 RX ORDER — NEEDLES, DISPOSABLE 25GX5/8"
18G X 1-1/2" NEEDLE, DISPOSABLE MISCELLANEOUS
Qty: 12 | Refills: 3 | Status: ACTIVE | COMMUNITY
Start: 2021-10-05 | End: 1900-01-01

## 2024-06-19 RX ORDER — SYRINGE WITH NEEDLE, 1 ML 25GX5/8"
22G X 1" SYRINGE, EMPTY DISPOSABLE MISCELLANEOUS
Qty: 12 | Refills: 3 | Status: ACTIVE | COMMUNITY
Start: 2018-10-19 | End: 1900-01-01

## 2024-06-19 RX ORDER — TESTOSTERONE CYPIONATE 200 MG/ML
200 INJECTION, SOLUTION INTRAMUSCULAR
Qty: 10 | Refills: 0 | Status: ACTIVE | COMMUNITY
Start: 2020-01-16 | End: 1900-01-01

## 2024-07-12 RX ORDER — TADALAFIL 5 MG/1
5 TABLET ORAL
Qty: 30 | Refills: 0 | Status: ACTIVE | COMMUNITY
Start: 2024-07-12 | End: 1900-01-01

## 2024-07-16 ENCOUNTER — NON-APPOINTMENT (OUTPATIENT)
Age: 44
End: 2024-07-16

## 2024-09-18 ENCOUNTER — NON-APPOINTMENT (OUTPATIENT)
Age: 44
End: 2024-09-18

## 2024-10-07 ENCOUNTER — APPOINTMENT (OUTPATIENT)
Dept: PHYSICAL MEDICINE AND REHAB | Facility: CLINIC | Age: 44
End: 2024-10-07
Payer: COMMERCIAL

## 2024-10-07 VITALS
RESPIRATION RATE: 16 BRPM | DIASTOLIC BLOOD PRESSURE: 76 MMHG | WEIGHT: 302 LBS | TEMPERATURE: 97.5 F | HEIGHT: 71 IN | SYSTOLIC BLOOD PRESSURE: 110 MMHG | OXYGEN SATURATION: 97 % | HEART RATE: 81 BPM | BODY MASS INDEX: 42.28 KG/M2

## 2024-10-07 DIAGNOSIS — J45.909 UNSPECIFIED ASTHMA, UNCOMPLICATED: ICD-10-CM

## 2024-10-07 DIAGNOSIS — N52.9 MALE ERECTILE DYSFUNCTION, UNSPECIFIED: ICD-10-CM

## 2024-10-07 PROCEDURE — 36410 VNPNXR 3YR/> PHY/QHP DX/THER: CPT

## 2024-10-07 PROCEDURE — 99213 OFFICE O/P EST LOW 20 MIN: CPT

## 2024-10-08 LAB
25(OH)D3 SERPL-MCNC: 144 NG/ML
ALBUMIN SERPL ELPH-MCNC: 4.5 G/DL
ALP BLD-CCNC: 63 U/L
ALT SERPL-CCNC: 38 U/L
ANION GAP SERPL CALC-SCNC: 14 MMOL/L
AST SERPL-CCNC: 25 U/L
BASOPHILS # BLD AUTO: 0.08 K/UL
BASOPHILS NFR BLD AUTO: 1 %
BILIRUB SERPL-MCNC: 0.5 MG/DL
BUN SERPL-MCNC: 13 MG/DL
CALCIUM SERPL-MCNC: 9.3 MG/DL
CHLORIDE SERPL-SCNC: 104 MMOL/L
CHOLEST SERPL-MCNC: 228 MG/DL
CO2 SERPL-SCNC: 21 MMOL/L
CREAT SERPL-MCNC: 1.32 MG/DL
EGFR: 67 ML/MIN/1.73M2
EOSINOPHIL # BLD AUTO: 0.16 K/UL
EOSINOPHIL NFR BLD AUTO: 2.1 %
ESTIMATED AVERAGE GLUCOSE: 114 MG/DL
GLUCOSE SERPL-MCNC: 99 MG/DL
HBA1C MFR BLD HPLC: 5.6 %
HCT VFR BLD CALC: 52.4 %
HDLC SERPL-MCNC: 38 MG/DL
HGB BLD-MCNC: 16.3 G/DL
IMM GRANULOCYTES NFR BLD AUTO: 0.3 %
LDLC SERPL CALC-MCNC: 158 MG/DL
LYMPHOCYTES # BLD AUTO: 2.05 K/UL
LYMPHOCYTES NFR BLD AUTO: 26.9 %
MAN DIFF?: NORMAL
MCHC RBC-ENTMCNC: 26.9 PG
MCHC RBC-ENTMCNC: 31.1 GM/DL
MCV RBC AUTO: 86.5 FL
MONOCYTES # BLD AUTO: 0.58 K/UL
MONOCYTES NFR BLD AUTO: 7.6 %
NEUTROPHILS # BLD AUTO: 4.73 K/UL
NEUTROPHILS NFR BLD AUTO: 62.1 %
NONHDLC SERPL-MCNC: 190 MG/DL
PLATELET # BLD AUTO: 346 K/UL
POTASSIUM SERPL-SCNC: 4.1 MMOL/L
PROT SERPL-MCNC: 7.3 G/DL
PSA FREE FLD-MCNC: 28 %
PSA FREE SERPL-MCNC: 1 NG/ML
PSA SERPL-MCNC: 3.61 NG/ML
RBC # BLD: 6.06 M/UL
RBC # FLD: 18 %
SODIUM SERPL-SCNC: 139 MMOL/L
TESTOST SERPL-MCNC: 751 NG/DL
TRIGL SERPL-MCNC: 173 MG/DL
WBC # FLD AUTO: 7.62 K/UL

## 2024-10-11 LAB — ESTROGEN SERPL-MCNC: 86 PG/ML

## 2024-10-14 ENCOUNTER — TRANSCRIPTION ENCOUNTER (OUTPATIENT)
Age: 44
End: 2024-10-14

## 2024-10-15 ENCOUNTER — APPOINTMENT (OUTPATIENT)
Dept: UROLOGY | Facility: CLINIC | Age: 44
End: 2024-10-15

## 2024-10-16 ENCOUNTER — APPOINTMENT (OUTPATIENT)
Dept: PHYSICAL MEDICINE AND REHAB | Facility: CLINIC | Age: 44
End: 2024-10-16
Payer: COMMERCIAL

## 2024-10-16 VITALS — BODY MASS INDEX: 42.28 KG/M2 | WEIGHT: 302 LBS | HEIGHT: 71 IN

## 2024-10-16 DIAGNOSIS — E66.9 OBESITY, UNSPECIFIED: ICD-10-CM

## 2024-10-16 DIAGNOSIS — E78.00 PURE HYPERCHOLESTEROLEMIA, UNSPECIFIED: ICD-10-CM

## 2024-10-16 DIAGNOSIS — E55.9 VITAMIN D DEFICIENCY, UNSPECIFIED: ICD-10-CM

## 2024-10-16 DIAGNOSIS — E29.1 TESTICULAR HYPOFUNCTION: ICD-10-CM

## 2024-10-16 DIAGNOSIS — I10 ESSENTIAL (PRIMARY) HYPERTENSION: ICD-10-CM

## 2024-10-16 PROCEDURE — 99213 OFFICE O/P EST LOW 20 MIN: CPT

## 2024-12-05 ENCOUNTER — NON-APPOINTMENT (OUTPATIENT)
Age: 44
End: 2024-12-05

## 2024-12-06 ENCOUNTER — APPOINTMENT (OUTPATIENT)
Dept: ORTHOPEDIC SURGERY | Facility: CLINIC | Age: 44
End: 2024-12-06
Payer: COMMERCIAL

## 2024-12-06 VITALS — BODY MASS INDEX: 42.28 KG/M2 | WEIGHT: 302 LBS | HEIGHT: 71 IN

## 2024-12-06 DIAGNOSIS — S69.90XA UNSPECIFIED INJURY OF UNSPECIFIED WRIST, HAND AND FINGER(S), INITIAL ENCOUNTER: ICD-10-CM

## 2024-12-06 PROCEDURE — 99203 OFFICE O/P NEW LOW 30 MIN: CPT

## 2024-12-17 ENCOUNTER — APPOINTMENT (OUTPATIENT)
Dept: UROLOGY | Facility: CLINIC | Age: 44
End: 2024-12-17

## 2024-12-17 VITALS
SYSTOLIC BLOOD PRESSURE: 127 MMHG | HEART RATE: 55 BPM | WEIGHT: 275 LBS | DIASTOLIC BLOOD PRESSURE: 83 MMHG | BODY MASS INDEX: 38.35 KG/M2

## 2024-12-17 DIAGNOSIS — N52.9 MALE ERECTILE DYSFUNCTION, UNSPECIFIED: ICD-10-CM

## 2024-12-17 DIAGNOSIS — R97.20 ELEVATED PROSTATE, SPECIFIC ANTIGEN [PSA]: ICD-10-CM

## 2024-12-17 DIAGNOSIS — N40.0 BENIGN PROSTATIC HYPERPLASIA WITHOUT LOWER URINARY TRACT SYMPMS: ICD-10-CM

## 2024-12-17 DIAGNOSIS — E29.1 TESTICULAR HYPOFUNCTION: ICD-10-CM

## 2024-12-17 PROCEDURE — 99214 OFFICE O/P EST MOD 30 MIN: CPT

## 2024-12-20 ENCOUNTER — NON-APPOINTMENT (OUTPATIENT)
Age: 44
End: 2024-12-20

## 2024-12-23 ENCOUNTER — APPOINTMENT (OUTPATIENT)
Dept: ORTHOPEDIC SURGERY | Facility: CLINIC | Age: 44
End: 2024-12-23

## 2025-03-18 ENCOUNTER — NON-APPOINTMENT (OUTPATIENT)
Age: 45
End: 2025-03-18

## 2025-03-19 DIAGNOSIS — E29.1 TESTICULAR HYPOFUNCTION: ICD-10-CM

## 2025-06-04 ENCOUNTER — TRANSCRIPTION ENCOUNTER (OUTPATIENT)
Age: 45
End: 2025-06-04

## 2025-06-10 ENCOUNTER — APPOINTMENT (OUTPATIENT)
Dept: UROLOGY | Facility: CLINIC | Age: 45
End: 2025-06-10

## 2025-07-01 ENCOUNTER — NON-APPOINTMENT (OUTPATIENT)
Age: 45
End: 2025-07-01

## 2025-07-01 ENCOUNTER — APPOINTMENT (OUTPATIENT)
Dept: UROLOGY | Facility: CLINIC | Age: 45
End: 2025-07-01

## 2025-07-01 VITALS
BODY MASS INDEX: 38.35 KG/M2 | WEIGHT: 275 LBS | HEART RATE: 67 BPM | DIASTOLIC BLOOD PRESSURE: 85 MMHG | SYSTOLIC BLOOD PRESSURE: 119 MMHG

## 2025-07-01 PROCEDURE — 99214 OFFICE O/P EST MOD 30 MIN: CPT

## 2025-07-11 ENCOUNTER — NON-APPOINTMENT (OUTPATIENT)
Age: 45
End: 2025-07-11

## 2025-07-11 RX ORDER — SYRINGE, DISPOSABLE, 1 ML
1 ML SYRINGE, EMPTY DISPOSABLE MISCELLANEOUS
Qty: 12 | Refills: 3 | Status: ACTIVE | COMMUNITY
Start: 2025-07-10 | End: 1900-01-01

## 2025-07-11 RX ORDER — NEEDLES, DISPOSABLE 25GX5/8"
23G X 1" NEEDLE, DISPOSABLE MISCELLANEOUS
Qty: 12 | Refills: 3 | Status: ACTIVE | COMMUNITY
Start: 2025-07-10 | End: 1900-01-01

## 2025-07-28 ENCOUNTER — APPOINTMENT (OUTPATIENT)
Dept: PHYSICAL MEDICINE AND REHAB | Facility: CLINIC | Age: 45
End: 2025-07-28
Payer: COMMERCIAL

## 2025-07-28 ENCOUNTER — LABORATORY RESULT (OUTPATIENT)
Age: 45
End: 2025-07-28

## 2025-07-28 ENCOUNTER — NON-APPOINTMENT (OUTPATIENT)
Age: 45
End: 2025-07-28

## 2025-07-28 VITALS
TEMPERATURE: 98.2 F | OXYGEN SATURATION: 98 % | WEIGHT: 275 LBS | BODY MASS INDEX: 38.5 KG/M2 | HEART RATE: 87 BPM | HEIGHT: 71 IN | SYSTOLIC BLOOD PRESSURE: 126 MMHG | RESPIRATION RATE: 16 BRPM | DIASTOLIC BLOOD PRESSURE: 84 MMHG

## 2025-07-28 DIAGNOSIS — E29.1 TESTICULAR HYPOFUNCTION: ICD-10-CM

## 2025-07-28 DIAGNOSIS — K42.9 UMBILICAL HERNIA W/OUT OBSTRUCTION OR GANGRENE: ICD-10-CM

## 2025-07-28 DIAGNOSIS — Z00.00 ENCOUNTER FOR GENERAL ADULT MEDICAL EXAMINATION W/OUT ABNORMAL FINDINGS: ICD-10-CM

## 2025-07-28 DIAGNOSIS — N52.9 MALE ERECTILE DYSFUNCTION, UNSPECIFIED: ICD-10-CM

## 2025-07-28 DIAGNOSIS — K43.9 VENTRAL HERNIA W/OUT OBSTRUCTION OR GANGRENE: ICD-10-CM

## 2025-07-28 DIAGNOSIS — E66.9 OBESITY, UNSPECIFIED: ICD-10-CM

## 2025-07-28 DIAGNOSIS — N40.0 BENIGN PROSTATIC HYPERPLASIA WITHOUT LOWER URINARY TRACT SYMPMS: ICD-10-CM

## 2025-07-28 LAB
BILIRUB UR QL STRIP: NEGATIVE
COLLECTION METHOD: NORMAL
GLUCOSE UR-MCNC: NEGATIVE
HCG UR QL: 0.2 EU/DL
HGB UR QL STRIP.AUTO: NEGATIVE
KETONES UR-MCNC: NEGATIVE
LEUKOCYTE ESTERASE UR QL STRIP: NEGATIVE
NITRITE UR QL STRIP: NEGATIVE
PH UR STRIP: 5.5
PROT UR STRIP-MCNC: NEGATIVE
SP GR UR STRIP: 1.02

## 2025-07-28 PROCEDURE — G0444 DEPRESSION SCREEN ANNUAL: CPT | Mod: 33,59

## 2025-07-28 PROCEDURE — 93000 ELECTROCARDIOGRAM COMPLETE: CPT | Mod: 59

## 2025-07-28 PROCEDURE — 36410 VNPNXR 3YR/> PHY/QHP DX/THER: CPT

## 2025-07-28 PROCEDURE — 81003 URINALYSIS AUTO W/O SCOPE: CPT | Mod: QW

## 2025-07-28 PROCEDURE — 99396 PREV VISIT EST AGE 40-64: CPT

## 2025-07-29 LAB
25(OH)D3 SERPL-MCNC: 73.8 NG/ML
ALBUMIN SERPL ELPH-MCNC: 4.5 G/DL
ALP BLD-CCNC: 62 U/L
ALT SERPL-CCNC: 38 U/L
ANION GAP SERPL CALC-SCNC: 15 MMOL/L
AST SERPL-CCNC: 27 U/L
B BURGDOR AB SER-IMP: NEGATIVE
B BURGDOR IGG+IGM SER QL: 0.33 INDEX
BASOPHILS # BLD AUTO: 0.11 K/UL
BASOPHILS NFR BLD AUTO: 1.5 %
BILIRUB SERPL-MCNC: 0.5 MG/DL
BUN SERPL-MCNC: 13 MG/DL
CALCIUM SERPL-MCNC: 9.9 MG/DL
CHLORIDE SERPL-SCNC: 103 MMOL/L
CHOLEST SERPL-MCNC: 194 MG/DL
CO2 SERPL-SCNC: 20 MMOL/L
CREAT SERPL-MCNC: 1.18 MG/DL
EGFRCR SERPLBLD CKD-EPI 2021: 77 ML/MIN/1.73M2
EOSINOPHIL # BLD AUTO: 0.29 K/UL
EOSINOPHIL NFR BLD AUTO: 3.8 %
ESTIMATED AVERAGE GLUCOSE: 108 MG/DL
ESTRADIOL SERPL-MCNC: 9 PG/ML
FERRITIN SERPL-MCNC: 20 NG/ML
FOLATE SERPL-MCNC: 18 NG/ML
GLUCOSE SERPL-MCNC: 90 MG/DL
HBA1C MFR BLD HPLC: 5.4 %
HCT VFR BLD CALC: 51.6 %
HDLC SERPL-MCNC: 40 MG/DL
HGB BLD-MCNC: 16.1 G/DL
IMM GRANULOCYTES NFR BLD AUTO: 0.3 %
IRON SATN MFR SERPL: 7 %
IRON SERPL-MCNC: 30 UG/DL
LDLC SERPL-MCNC: 127 MG/DL
LYMPHOCYTES # BLD AUTO: 2.55 K/UL
LYMPHOCYTES NFR BLD AUTO: 33.8 %
MAGNESIUM SERPL-MCNC: 2.1 MG/DL
MAN DIFF?: NORMAL
MCHC RBC-ENTMCNC: 26.4 PG
MCHC RBC-ENTMCNC: 31.2 G/DL
MCV RBC AUTO: 84.5 FL
MONOCYTES # BLD AUTO: 0.58 K/UL
MONOCYTES NFR BLD AUTO: 7.7 %
NEUTROPHILS # BLD AUTO: 4 K/UL
NEUTROPHILS NFR BLD AUTO: 52.9 %
NONHDLC SERPL-MCNC: 154 MG/DL
PLATELET # BLD AUTO: 286 K/UL
POTASSIUM SERPL-SCNC: 4.3 MMOL/L
PROT SERPL-MCNC: 7.4 G/DL
PSA FREE FLD-MCNC: 15 %
PSA FREE SERPL-MCNC: 0.7 NG/ML
PSA SERPL-MCNC: 4.54 NG/ML
RBC # BLD: 6.11 M/UL
RBC # FLD: 18.1 %
SODIUM SERPL-SCNC: 138 MMOL/L
T3 SERPL-MCNC: 126 NG/DL
T3RU NFR SERPL: 1 TBI
T4 FREE SERPL-MCNC: 1.3 NG/DL
TESTOST SERPL-MCNC: 818 NG/DL
TIBC SERPL-MCNC: 402 UG/DL
TRIGL SERPL-MCNC: 150 MG/DL
TSH SERPL-ACNC: 1.07 UIU/ML
UIBC SERPL-MCNC: 372 UG/DL
VIT B12 SERPL-MCNC: 1217 PG/ML
WBC # FLD AUTO: 7.55 K/UL

## 2025-08-01 LAB — ESTROGEN SERPL-MCNC: 110 PG/ML

## 2025-08-07 ENCOUNTER — APPOINTMENT (OUTPATIENT)
Dept: PHYSICAL MEDICINE AND REHAB | Facility: CLINIC | Age: 45
End: 2025-08-07
Payer: COMMERCIAL

## 2025-08-07 VITALS — WEIGHT: 275 LBS | HEIGHT: 71 IN | BODY MASS INDEX: 38.5 KG/M2

## 2025-08-07 DIAGNOSIS — E55.9 VITAMIN D DEFICIENCY, UNSPECIFIED: ICD-10-CM

## 2025-08-07 DIAGNOSIS — J45.909 UNSPECIFIED ASTHMA, UNCOMPLICATED: ICD-10-CM

## 2025-08-07 DIAGNOSIS — R97.20 ELEVATED PROSTATE, SPECIFIC ANTIGEN [PSA]: ICD-10-CM

## 2025-08-07 DIAGNOSIS — E78.00 PURE HYPERCHOLESTEROLEMIA, UNSPECIFIED: ICD-10-CM

## 2025-08-07 DIAGNOSIS — I10 ESSENTIAL (PRIMARY) HYPERTENSION: ICD-10-CM

## 2025-08-07 PROCEDURE — 99214 OFFICE O/P EST MOD 30 MIN: CPT | Mod: 93

## 2025-09-02 ENCOUNTER — NON-APPOINTMENT (OUTPATIENT)
Age: 45
End: 2025-09-02

## 2025-09-02 DIAGNOSIS — R97.20 ELEVATED PROSTATE, SPECIFIC ANTIGEN [PSA]: ICD-10-CM

## 2025-09-06 LAB
PSA FREE FLD-MCNC: 26 %
PSA FREE SERPL-MCNC: 0.55 NG/ML
PSA SERPL-MCNC: 2.1 NG/ML